# Patient Record
Sex: MALE | Race: WHITE | NOT HISPANIC OR LATINO | Employment: FULL TIME | URBAN - METROPOLITAN AREA
[De-identification: names, ages, dates, MRNs, and addresses within clinical notes are randomized per-mention and may not be internally consistent; named-entity substitution may affect disease eponyms.]

---

## 2023-07-25 ENCOUNTER — OFFICE VISIT (OUTPATIENT)
Dept: PRIMARY CARE | Facility: CLINIC | Age: 58
End: 2023-07-25
Payer: COMMERCIAL

## 2023-07-25 VITALS
DIASTOLIC BLOOD PRESSURE: 78 MMHG | HEIGHT: 69 IN | WEIGHT: 194.4 LBS | SYSTOLIC BLOOD PRESSURE: 132 MMHG | RESPIRATION RATE: 18 BRPM | BODY MASS INDEX: 28.79 KG/M2 | TEMPERATURE: 98.6 F | HEART RATE: 90 BPM | OXYGEN SATURATION: 96 %

## 2023-07-25 DIAGNOSIS — R35.1 NOCTURIA: ICD-10-CM

## 2023-07-25 DIAGNOSIS — R79.89 ELEVATED LFTS: ICD-10-CM

## 2023-07-25 DIAGNOSIS — I10 PRIMARY HYPERTENSION: ICD-10-CM

## 2023-07-25 DIAGNOSIS — S46.012A STRAIN OF ROTATOR CUFF OF LEFT SHOULDER: Primary | ICD-10-CM

## 2023-07-25 DIAGNOSIS — E78.2 MIXED HYPERLIPIDEMIA: ICD-10-CM

## 2023-07-25 PROCEDURE — 3008F BODY MASS INDEX DOCD: CPT | Performed by: FAMILY MEDICINE

## 2023-07-25 PROCEDURE — 3078F DIAST BP <80 MM HG: CPT | Performed by: FAMILY MEDICINE

## 2023-07-25 PROCEDURE — 3075F SYST BP GE 130 - 139MM HG: CPT | Performed by: FAMILY MEDICINE

## 2023-07-25 PROCEDURE — 99213 OFFICE O/P EST LOW 20 MIN: CPT | Performed by: FAMILY MEDICINE

## 2023-07-25 PROCEDURE — 4004F PT TOBACCO SCREEN RCVD TLK: CPT | Performed by: FAMILY MEDICINE

## 2023-07-25 RX ORDER — METOPROLOL SUCCINATE 100 MG/1
1 TABLET, EXTENDED RELEASE ORAL DAILY
COMMUNITY
Start: 2019-05-23 | End: 2023-12-12 | Stop reason: SDUPTHER

## 2023-07-25 RX ORDER — ROSUVASTATIN CALCIUM 40 MG/1
TABLET, COATED ORAL
COMMUNITY
Start: 2022-01-20 | End: 2023-07-25 | Stop reason: SDUPTHER

## 2023-07-25 RX ORDER — EPLERENONE 25 MG/1
25 TABLET, FILM COATED ORAL DAILY
Qty: 30 TABLET | Refills: 5 | Status: SHIPPED | OUTPATIENT
Start: 2023-07-25 | End: 2023-12-12 | Stop reason: SDUPTHER

## 2023-07-25 RX ORDER — LISINOPRIL AND HYDROCHLOROTHIAZIDE 20; 25 MG/1; MG/1
1 TABLET ORAL DAILY
COMMUNITY
Start: 2019-04-15 | End: 2023-12-12 | Stop reason: SDUPTHER

## 2023-07-25 RX ORDER — EPLERENONE 25 MG/1
1 TABLET, FILM COATED ORAL DAILY
COMMUNITY
Start: 2022-03-23 | End: 2023-07-25 | Stop reason: SDUPTHER

## 2023-07-25 RX ORDER — ROSUVASTATIN CALCIUM 40 MG/1
40 TABLET, COATED ORAL DAILY
Qty: 30 TABLET | Refills: 5 | Status: SHIPPED | OUTPATIENT
Start: 2023-07-25 | End: 2023-09-05 | Stop reason: SDUPTHER

## 2023-07-25 RX ORDER — MECLIZINE HCL 12.5 MG 12.5 MG/1
1 TABLET ORAL 3 TIMES DAILY PRN
COMMUNITY
End: 2023-12-12 | Stop reason: SDUPTHER

## 2023-07-25 RX ORDER — CHOLECALCIFEROL (VITAMIN D3) 25 MCG
1 TABLET ORAL DAILY
COMMUNITY

## 2023-07-25 RX ORDER — AMLODIPINE BESYLATE 10 MG/1
1 TABLET ORAL DAILY
COMMUNITY
Start: 2018-04-25 | End: 2023-12-12 | Stop reason: SDUPTHER

## 2023-07-25 ASSESSMENT — LIFESTYLE VARIABLES
HOW MANY STANDARD DRINKS CONTAINING ALCOHOL DO YOU HAVE ON A TYPICAL DAY: 3 OR 4
SKIP TO QUESTIONS 9-10: 0
HOW OFTEN DO YOU HAVE SIX OR MORE DRINKS ON ONE OCCASION: WEEKLY
HOW OFTEN DO YOU HAVE A DRINK CONTAINING ALCOHOL: 2-3 TIMES A WEEK
AUDIT-C TOTAL SCORE: 7

## 2023-07-25 ASSESSMENT — PATIENT HEALTH QUESTIONNAIRE - PHQ9
2. FEELING DOWN, DEPRESSED OR HOPELESS: NOT AT ALL
SUM OF ALL RESPONSES TO PHQ9 QUESTIONS 1 & 2: 0
1. LITTLE INTEREST OR PLEASURE IN DOING THINGS: NOT AT ALL

## 2023-07-25 ASSESSMENT — ENCOUNTER SYMPTOMS
ARTHRALGIAS: 1
DEPRESSION: 0
OCCASIONAL FEELINGS OF UNSTEADINESS: 0
LOSS OF SENSATION IN FEET: 0

## 2023-07-25 NOTE — PATIENT INSTRUCTIONS
Diagnoses and all orders for this visit:  Strain of rotator cuff of left shoulder  -     Referral to Orthopaedic Surgery; Future  Primary hypertension  -     CBC and Auto Differential; Future  -     TSH with reflex to Free T4 if abnormal; Future  -     eplerenone (Inspra) 25 mg tablet; Take 1 tablet (25 mg) by mouth once daily.  Mixed hyperlipidemia  -     Lipid Panel; Future  -     rosuvastatin (Crestor) 40 mg tablet; Take 1 tablet (40 mg) by mouth once daily.  Elevated LFTs  -     Comprehensive Metabolic Panel; Future  Nocturia  -     Prostate Specific Antigen; Future

## 2023-07-25 NOTE — PROGRESS NOTES
Subjective   Patient ID: Kalyan Ramon is a 57 y.o. male.    Shoulder Injury     Right handed  Left sided shoulder pain, right handed, and onset 7/4/2023. Since then cannot lift above 45 degrees  Review of Systems   Musculoskeletal:  Positive for arthralgias.        Left shoulder pain   All other systems reviewed and are negative.      Objective   Physical Exam  Vitals reviewed.   Constitutional:       Appearance: Normal appearance.   HENT:      Head: Normocephalic and atraumatic.      Right Ear: Tympanic membrane normal.      Left Ear: Tympanic membrane normal.      Nose: Nose normal.      Mouth/Throat:      Mouth: Mucous membranes are moist.      Pharynx: Oropharynx is clear.   Eyes:      Extraocular Movements: Extraocular movements intact.      Conjunctiva/sclera: Conjunctivae normal.      Pupils: Pupils are equal, round, and reactive to light.   Cardiovascular:      Rate and Rhythm: Normal rate and regular rhythm.      Pulses: Normal pulses.      Heart sounds: Normal heart sounds.   Pulmonary:      Effort: Pulmonary effort is normal.      Breath sounds: Normal breath sounds.   Abdominal:      General: Abdomen is flat. Bowel sounds are normal.      Palpations: Abdomen is soft.   Musculoskeletal:      Cervical back: Normal range of motion and neck supple.      Comments: Left shoulder with decreased range of motion, in flexion and internal external rotation no crepitus, minimal discomfort. Strength 5/5 reflexes 2+   Skin:     General: Skin is warm and dry.      Capillary Refill: Capillary refill takes less than 2 seconds.   Neurological:      General: No focal deficit present.      Mental Status: He is alert and oriented to person, place, and time.   Psychiatric:         Mood and Affect: Mood normal.         Behavior: Behavior normal.         Assessment/Plan   Diagnoses and all orders for this visit:  Strain of rotator cuff of left shoulder  -     Referral to Orthopaedic Surgery; Future  Primary hypertension  -      CBC and Auto Differential; Future  -     TSH with reflex to Free T4 if abnormal; Future  -     eplerenone (Inspra) 25 mg tablet; Take 1 tablet (25 mg) by mouth once daily.  Mixed hyperlipidemia  -     Lipid Panel; Future  -     rosuvastatin (Crestor) 40 mg tablet; Take 1 tablet (40 mg) by mouth once daily.  Elevated LFTs  -     Comprehensive Metabolic Panel; Future  Nocturia  -     Prostate Specific Antigen; Future

## 2023-09-05 ENCOUNTER — TELEPHONE (OUTPATIENT)
Dept: PRIMARY CARE | Facility: CLINIC | Age: 58
End: 2023-09-05
Payer: COMMERCIAL

## 2023-09-05 DIAGNOSIS — E78.2 MIXED HYPERLIPIDEMIA: ICD-10-CM

## 2023-09-05 RX ORDER — ROSUVASTATIN CALCIUM 40 MG/1
40 TABLET, COATED ORAL DAILY
Qty: 30 TABLET | Refills: 5 | Status: SHIPPED | OUTPATIENT
Start: 2023-09-05 | End: 2023-09-05 | Stop reason: SDUPTHER

## 2023-09-05 RX ORDER — ROSUVASTATIN CALCIUM 20 MG/1
20 TABLET, COATED ORAL DAILY
Qty: 30 TABLET | Refills: 5 | Status: SHIPPED | OUTPATIENT
Start: 2023-09-05 | End: 2023-12-12 | Stop reason: WASHOUT

## 2023-09-05 NOTE — TELEPHONE ENCOUNTER
Needs 20 Mg Rouvastatin sent to [facility] pharmacy will not fill 40 cardiologist wants it at 20 mg

## 2023-09-22 ENCOUNTER — HOSPITAL ENCOUNTER (OUTPATIENT)
Dept: DATA CONVERSION | Facility: HOSPITAL | Age: 58
Discharge: HOME | End: 2023-09-22

## 2023-09-22 DIAGNOSIS — M75.22 BICIPITAL TENDINITIS, LEFT SHOULDER: ICD-10-CM

## 2023-09-22 DIAGNOSIS — M75.42 IMPINGEMENT SYNDROME OF LEFT SHOULDER: ICD-10-CM

## 2023-09-22 DIAGNOSIS — M75.102 UNSPECIFIED ROTATOR CUFF TEAR OR RUPTURE OF LEFT SHOULDER, NOT SPECIFIED AS TRAUMATIC: ICD-10-CM

## 2023-09-22 LAB
ALBUMIN SERPL-MCNC: 4.8 GM/DL (ref 3.5–5)
ALBUMIN/GLOB SERPL: 1.4 RATIO (ref 1.5–3)
ALP BLD-CCNC: 91 U/L (ref 35–125)
ALT SERPL-CCNC: 42 U/L (ref 5–40)
ANION GAP SERPL CALCULATED.3IONS-SCNC: 12 MMOL/L (ref 0–19)
ANTICOAGULANT: ABNORMAL
APTT PPP: 24.1 SEC (ref 22–32.5)
AST SERPL-CCNC: 32 U/L (ref 5–40)
BASOPHILS # BLD AUTO: 0.03 K/UL (ref 0–0.22)
BASOPHILS NFR BLD AUTO: 0.4 % (ref 0–1)
BILIRUB SERPL-MCNC: 0.5 MG/DL (ref 0.1–1.2)
BILIRUB UR QL STRIP.AUTO: NEGATIVE
BUN SERPL-MCNC: 11 MG/DL (ref 8–25)
BUN/CREAT SERPL: 13.8 RATIO (ref 8–21)
CALCIUM SERPL-MCNC: 10.1 MG/DL (ref 8.5–10.4)
CHLORIDE SERPL-SCNC: 100 MMOL/L (ref 97–107)
CLARITY UR: CLEAR
CO2 SERPL-SCNC: 26 MMOL/L (ref 24–31)
COLOR UR: YELLOW
CREAT SERPL-MCNC: 0.8 MG/DL (ref 0.4–1.6)
DEPRECATED RDW RBC AUTO: 39.4 FL (ref 37–54)
DIFFERENTIAL METHOD BLD: ABNORMAL
EOSINOPHIL # BLD AUTO: 0.16 K/UL (ref 0–0.45)
EOSINOPHIL NFR BLD: 2 % (ref 0–3)
ERYTHROCYTE [DISTWIDTH] IN BLOOD BY AUTOMATED COUNT: 12 % (ref 11.7–15)
GFR SERPL CREATININE-BSD FRML MDRD: 105 ML/MIN/1.73 M2
GLOBULIN SER-MCNC: 3.4 G/DL (ref 1.9–3.7)
GLUCOSE SERPL-MCNC: 133 MG/DL (ref 65–99)
GLUCOSE UR STRIP.AUTO-MCNC: 100 MG/DL
HCT VFR BLD AUTO: 43.7 % (ref 41–50)
HGB BLD-MCNC: 15 GM/DL (ref 13.5–16.5)
HGB UR QL STRIP.AUTO: ABNORMAL /HPF (ref 0–3)
HGB UR QL: NEGATIVE
IMM GRANULOCYTES # BLD AUTO: 0.03 K/UL (ref 0–0.1)
INR PPP: 0.9 (ref 0.86–1.16)
KETONES UR QL STRIP.AUTO: NEGATIVE
LEUKOCYTE ESTERASE UR QL STRIP.AUTO: NEGATIVE
LYMPHOCYTES # BLD AUTO: 1.18 K/UL (ref 1.2–3.2)
LYMPHOCYTES NFR BLD MANUAL: 14.5 % (ref 20–40)
MCH RBC QN AUTO: 30.7 PG (ref 26–34)
MCHC RBC AUTO-ENTMCNC: 34.3 % (ref 31–37)
MCV RBC AUTO: 89.5 FL (ref 80–100)
MICROSCOPIC (UA): ABNORMAL
MONOCYTES # BLD AUTO: 0.83 K/UL (ref 0–0.8)
MONOCYTES NFR BLD MANUAL: 10.2 % (ref 0–8)
NEUTROPHILS # BLD AUTO: 5.89 K/UL
NEUTROPHILS # BLD AUTO: 5.89 K/UL (ref 1.8–7.7)
NEUTROPHILS.IMMATURE NFR BLD: 0.4 % (ref 0–1)
NEUTS SEG NFR BLD: 72.5 % (ref 50–70)
NITRITE UR QL STRIP.AUTO: NEGATIVE
PH UR STRIP.AUTO: 6.5 [PH] (ref 4.6–8)
PLATELET # BLD AUTO: 289 K/UL (ref 150–450)
PMV BLD AUTO: 8.3 CU (ref 7–12.6)
POTASSIUM SERPL-SCNC: 4.1 MMOL/L (ref 3.4–5.1)
PROT SERPL-MCNC: 8.2 G/DL (ref 5.9–7.9)
PROT UR STRIP.AUTO-MCNC: NEGATIVE MG/DL
PROTHROMBIN TIME: 9.2 SEC (ref 9.3–12.7)
RBC # BLD AUTO: 4.88 M/UL (ref 4.5–5.5)
SODIUM SERPL-SCNC: 138 MMOL/L (ref 133–145)
SP GR UR STRIP.AUTO: 1.01 (ref 1–1.03)
URINE CULTURE: ABNORMAL
UROBILINOGEN UR QL STRIP.AUTO: 0.2 MG/DL (ref 0–1)
WBC # BLD AUTO: 8.1 K/UL (ref 4.5–11)
WBC #/AREA URNS AUTO: ABNORMAL /HPF (ref 0–3)

## 2023-09-29 ENCOUNTER — HOSPITAL ENCOUNTER (OUTPATIENT)
Dept: DATA CONVERSION | Facility: HOSPITAL | Age: 58
Discharge: HOME | End: 2023-09-29

## 2023-09-29 DIAGNOSIS — M75.42 IMPINGEMENT SYNDROME OF LEFT SHOULDER: ICD-10-CM

## 2023-09-29 DIAGNOSIS — M75.102 UNSPECIFIED ROTATOR CUFF TEAR OR RUPTURE OF LEFT SHOULDER, NOT SPECIFIED AS TRAUMATIC: ICD-10-CM

## 2023-09-29 DIAGNOSIS — M75.22 BICIPITAL TENDINITIS, LEFT SHOULDER: ICD-10-CM

## 2023-10-13 PROBLEM — E66.9 OBESITY: Status: ACTIVE | Noted: 2023-10-13

## 2023-10-13 PROBLEM — E78.1 HIGH TRIGLYCERIDES: Status: ACTIVE | Noted: 2023-10-13

## 2023-10-13 PROBLEM — I10 ESSENTIAL HYPERTENSION, BENIGN: Status: ACTIVE | Noted: 2023-10-13

## 2023-10-13 PROBLEM — E78.5 DYSLIPIDEMIA: Status: ACTIVE | Noted: 2023-10-13

## 2023-10-13 PROBLEM — R42 DIZZINESS: Status: ACTIVE | Noted: 2023-10-13

## 2023-10-13 PROBLEM — T14.8XXA WOUND INFECTION: Status: ACTIVE | Noted: 2023-10-13

## 2023-10-13 PROBLEM — I25.10 CORONARY ARTERY CALCIFICATION: Status: ACTIVE | Noted: 2023-10-13

## 2023-10-13 PROBLEM — M75.102 TEAR OF LEFT ROTATOR CUFF: Status: ACTIVE | Noted: 2023-07-25

## 2023-10-13 PROBLEM — E03.9 HYPOTHYROIDISM: Status: ACTIVE | Noted: 2023-10-13

## 2023-10-13 PROBLEM — R35.1 NOCTURIA: Status: ACTIVE | Noted: 2023-10-13

## 2023-10-13 PROBLEM — I25.84 CORONARY ARTERY CALCIFICATION: Status: ACTIVE | Noted: 2023-10-13

## 2023-10-13 PROBLEM — L08.9 WOUND INFECTION: Status: ACTIVE | Noted: 2023-10-13

## 2023-10-13 PROBLEM — R93.1 ELEVATED CORONARY ARTERY CALCIUM SCORE: Status: ACTIVE | Noted: 2023-10-13

## 2023-10-13 PROBLEM — K43.2 HERNIATION THROUGH SURGICAL SITE: Status: ACTIVE | Noted: 2023-10-13

## 2023-10-13 RX ORDER — OXYCODONE HYDROCHLORIDE 5 MG/1
1 TABLET ORAL EVERY 4 HOURS PRN
COMMUNITY
End: 2023-12-12 | Stop reason: ALTCHOICE

## 2023-10-13 RX ORDER — ACETAMINOPHEN 500 MG
2 TABLET ORAL EVERY 8 HOURS
COMMUNITY

## 2023-10-13 RX ORDER — ONDANSETRON 4 MG/1
1 TABLET, FILM COATED ORAL EVERY 6 HOURS PRN
COMMUNITY
End: 2023-12-12 | Stop reason: ALTCHOICE

## 2023-10-13 RX ORDER — MELOXICAM 7.5 MG/1
1 TABLET ORAL
COMMUNITY
Start: 2023-08-14 | End: 2023-12-12 | Stop reason: ALTCHOICE

## 2023-10-16 ENCOUNTER — OFFICE VISIT (OUTPATIENT)
Dept: ORTHOPEDIC SURGERY | Facility: CLINIC | Age: 58
End: 2023-10-16
Payer: COMMERCIAL

## 2023-10-16 VITALS — HEIGHT: 69 IN | WEIGHT: 190 LBS | BODY MASS INDEX: 28.14 KG/M2

## 2023-10-16 DIAGNOSIS — S46.012D TRAUMATIC COMPLETE TEAR OF LEFT ROTATOR CUFF, SUBSEQUENT ENCOUNTER: Primary | ICD-10-CM

## 2023-10-16 PROCEDURE — 99024 POSTOP FOLLOW-UP VISIT: CPT | Performed by: STUDENT IN AN ORGANIZED HEALTH CARE EDUCATION/TRAINING PROGRAM

## 2023-10-16 PROCEDURE — 4004F PT TOBACCO SCREEN RCVD TLK: CPT | Performed by: STUDENT IN AN ORGANIZED HEALTH CARE EDUCATION/TRAINING PROGRAM

## 2023-10-16 PROCEDURE — 3008F BODY MASS INDEX DOCD: CPT | Performed by: STUDENT IN AN ORGANIZED HEALTH CARE EDUCATION/TRAINING PROGRAM

## 2023-10-16 ASSESSMENT — PAIN - FUNCTIONAL ASSESSMENT: PAIN_FUNCTIONAL_ASSESSMENT: 0-10

## 2023-10-16 ASSESSMENT — PAIN SCALES - GENERAL: PAINLEVEL_OUTOF10: 1

## 2023-10-16 NOTE — PROGRESS NOTES
2 weeks status post LEFT SHOULDER ARTHROSCOPY, ROTATOR CUFF REPAIR, BICEPS TENODESIS, SUBACROMIAL DECOMPRESSION, BICEPS TENODESIS done 9/29/23. Patient states he is doing well with minimal pain.

## 2023-10-23 NOTE — PROGRESS NOTES
PRIMARY CARE PHYSICIAN: Indy Del Cid MD    ORTHOPAEDIC POSTOPERATIVE VISIT    ASSESSMENT & PLAN    Impression: 57 y.o. male 2 weeks postop s/p left shoulder arthroscopy, rotator cuff repair, biceps tenodesis and subacromial decompression.    Plan:   Overall he is doing well.  He will begin working on [employer]'s pendulums but will remain in the sling otherwise.  No formal range of motion of the shoulder at this time.    I reviewed the intraoperative findings with the patient and answered all their questions. I reviewed their postoperative timeline and plan with them. They understand the postoperative precautions and the treatment plan going forward.     Follow-Up: Patient will follow-up 4 weeks, no x-rays    At the end of the visit, all questions were answered in full. The patient is in agreement with the plan and recommendations. They will call the office with any questions/concerns.      Note dictated with [email or web address] software. Completed without full typed error editing and sent to avoid delay.      SUBJECTIVE  CHIEF COMPLAINT:   Chief Complaint   Patient presents with    Left Shoulder - Post-op        HPI: Kalyan Ramon is a 57 y.o. patient. Kalyan Ramon is now postop status post left shoulder arthroscopy, rotator cuff repair, biceps tenodesis and subacromial decompression.  Overall he is doing well.  He is adhering to his postoperative precautions.    REVIEW OF SYSTEMS  Constitutional: See HPI for pain assessment, No significant weight loss, recent trauma  Cardiovascular: No chest pain, shortness of breath  Respiratory: No difficulty breathing, cough  Gastrointestinal: No nausea, vomiting, diarrhea, constipation  Musculoskeletal: Noted in HPI, positive for pain, restricted motion, stiffness  Integumentary: No rashes, easy bruising, redness   Neurological: no numbness or tingling in extremities, no gait disturbances   Psychiatric: No mood changes, memory changes, social  "issues  Heme/Lymph: no excessive swelling, easy bruising, excessive bleeding  ENT: No hearing changes  Eyes: No vision changes    CURRENT MEDICATIONS:   Current Outpatient Medications   Medication Sig Dispense Refill    acetaminophen (Tylenol) 500 mg tablet Take 2 tablets (1,000 mg) by mouth every 8 hours.      amLODIPine (Norvasc) 10 mg tablet Take 1 tablet (10 mg) by mouth once daily.      cholecalciferol (Vitamin D-3) 25 MCG (1000 UT) tablet Take 1 tablet (25 mcg) by mouth once daily.      eplerenone (Inspra) 25 mg tablet Take 1 tablet (25 mg) by mouth once daily. 30 tablet 5    lisinopriL-hydrochlorothiazide 20-25 mg tablet Take 1 tablet by mouth once daily.      meclizine (Antivert) 12.5 mg tablet Take 1 tablet (12.5 mg) by mouth 3 times a day as needed.      metoprolol succinate XL (Toprol-XL) 100 mg 24 hr tablet Take 1 tablet (100 mg) by mouth once daily.      rosuvastatin (Crestor) 20 mg tablet Take 1 tablet (20 mg) by mouth once daily. 30 tablet 5    meloxicam (Mobic) 7.5 mg tablet Take 1 tablet (7.5 mg) by mouth 2 times a day with meals.      ondansetron (Zofran) 4 mg tablet Take 1 tablet (4 mg) by mouth every 6 hours if needed for nausea.      oxyCODONE (Roxicodone) 5 mg immediate release tablet Take 1 tablet (5 mg) by mouth every 4 hours if needed (pain).       No current facility-administered medications for this visit.        OBJECTIVE    PHYSICAL EXAM      1/20/2022     3:33 PM 2/24/2022     2:57 PM 8/29/2022     1:03 PM 12/19/2022     8:13 AM 3/2/2023     2:37 PM 7/25/2023    11:38 AM 10/16/2023     2:23 PM   Vitals   Systolic 188 158 138 140 130 132    Diastolic 96 80 58 78 80 78    Heart Rate 82 89 84 76 97 90    Temp    36.5 °C (97.7 °F)  37 °C (98.6 °F)    Resp 17 17  17  18    Height (in) 1.727 m (5' 8\") 1.727 m (5' 8\") 1.727 m (5' 8\") 1.727 m (5' 8\") 1.727 m (5' 8\") 1.753 m (5' 9\") 1.753 m (5' 9\")   Weight (lb) 193 191 191 193 193 194.4 190   BMI 29.35 kg/m2 29.04 kg/m2 29.04 kg/m2 29.35 kg/m2 " 29.35 kg/m2 28.71 kg/m2 28.06 kg/m2   BSA (m2) 2.05 m2 2.04 m2 2.04 m2 2.05 m2 2.05 m2 2.07 m2 2.05 m2   Visit Report      Report Report      Body mass index is 28.06 kg/m².    General: Well-appearing, no acute distress    Skin intact bilateral upper and lower extremities  No erythema  No warmth    Detailed examination of the left shoulder demonstrates:  Surgical incision(s) healing well, Steri-Strips in place  No erythema or warmth  No drainage  No ecchymosis  Resolving swelling, minimal  Biceps contour normal  Shoulder range of motion deferred  Upper extremity motor grossly intact  C5-T1 sensation intact bilaterally  2+ radial pulses bilaterally  Warm and well-perfused, brisk capillary refill    IMAGING:   No new imaging      Pete Ramirez MD  Attending Surgeon    Sports Medicine Orthopaedic Surgery  [facility] [place]  [facility]

## 2023-11-20 ENCOUNTER — OFFICE VISIT (OUTPATIENT)
Dept: ORTHOPEDIC SURGERY | Facility: CLINIC | Age: 58
End: 2023-11-20
Payer: COMMERCIAL

## 2023-11-20 VITALS — WEIGHT: 190 LBS | HEIGHT: 69 IN | BODY MASS INDEX: 28.14 KG/M2

## 2023-11-20 DIAGNOSIS — S46.012D TRAUMATIC COMPLETE TEAR OF LEFT ROTATOR CUFF, SUBSEQUENT ENCOUNTER: ICD-10-CM

## 2023-11-20 PROCEDURE — 3008F BODY MASS INDEX DOCD: CPT | Performed by: STUDENT IN AN ORGANIZED HEALTH CARE EDUCATION/TRAINING PROGRAM

## 2023-11-20 PROCEDURE — 99024 POSTOP FOLLOW-UP VISIT: CPT | Performed by: STUDENT IN AN ORGANIZED HEALTH CARE EDUCATION/TRAINING PROGRAM

## 2023-11-20 PROCEDURE — 4004F PT TOBACCO SCREEN RCVD TLK: CPT | Performed by: STUDENT IN AN ORGANIZED HEALTH CARE EDUCATION/TRAINING PROGRAM

## 2023-11-20 NOTE — PROGRESS NOTES
PRIMARY CARE PHYSICIAN: Indy Del Cid MD    ORTHOPAEDIC POSTOPERATIVE VISIT    ASSESSMENT & PLAN    Impression: 57 y.o. male 7 weeks postop s/p left shoulder arthroscopy, rotator cuff repair, biceps tenodesis and subacromial decompression.    Plan:   Overall he is doing well.  He will discontinue his sling and begin working with physical therapy through the postoperative protocol for the above.  He will focus on regaining his passive range of motion.    I reviewed their postoperative timeline and plan with them. They understand the postoperative precautions and the treatment plan going forward.     Follow-Up: Patient will follow-up 6 weeks, no x-rays    At the end of the visit, all questions were answered in full. The patient is in agreement with the plan and recommendations. They will call the office with any questions/concerns.      Note dictated with [employer] software. Completed without full typed error editing and sent to avoid delay.      SUBJECTIVE  CHIEF COMPLAINT:   Chief Complaint   Patient presents with    Left Shoulder - Post-op     9/29/23        HPI: Kalyan Ramon is a 57 y.o. patient. Kalyan Ramon is now 7 weeks postop status post left shoulder arthroscopy, rotator cuff repair, biceps tenodesis and subacromial decompression.  Overall he is doing well.  He is adhering to his postoperative precautions.    REVIEW OF SYSTEMS  Constitutional: See HPI for pain assessment, No significant weight loss, recent trauma  Cardiovascular: No chest pain, shortness of breath  Respiratory: No difficulty breathing, cough  Gastrointestinal: No nausea, vomiting, diarrhea, constipation  Musculoskeletal: Noted in HPI, positive for pain, restricted motion, stiffness  Integumentary: No rashes, easy bruising, redness   Neurological: no numbness or tingling in extremities, no gait disturbances   Psychiatric: No mood changes, memory changes, social issues  Heme/Lymph: no excessive swelling, easy  "bruising, excessive bleeding  ENT: No hearing changes  Eyes: No vision changes    CURRENT MEDICATIONS:   Current Outpatient Medications   Medication Sig Dispense Refill    acetaminophen (Tylenol) 500 mg tablet Take 2 tablets (1,000 mg) by mouth every 8 hours.      amLODIPine (Norvasc) 10 mg tablet Take 1 tablet (10 mg) by mouth once daily.      cholecalciferol (Vitamin D-3) 25 MCG (1000 UT) tablet Take 1 tablet (25 mcg) by mouth once daily.      eplerenone (Inspra) 25 mg tablet Take 1 tablet (25 mg) by mouth once daily. 30 tablet 5    lisinopriL-hydrochlorothiazide 20-25 mg tablet Take 1 tablet by mouth once daily.      meclizine (Antivert) 12.5 mg tablet Take 1 tablet (12.5 mg) by mouth 3 times a day as needed.      meloxicam (Mobic) 7.5 mg tablet Take 1 tablet (7.5 mg) by mouth 2 times a day with meals.      metoprolol succinate XL (Toprol-XL) 100 mg 24 hr tablet Take 1 tablet (100 mg) by mouth once daily.      rosuvastatin (Crestor) 20 mg tablet Take 1 tablet (20 mg) by mouth once daily. 30 tablet 5    ondansetron (Zofran) 4 mg tablet Take 1 tablet (4 mg) by mouth every 6 hours if needed for nausea.      oxyCODONE (Roxicodone) 5 mg immediate release tablet Take 1 tablet (5 mg) by mouth every 4 hours if needed (pain).       No current facility-administered medications for this visit.        OBJECTIVE    PHYSICAL EXAM      2/24/2022     2:57 PM 8/29/2022     1:03 PM 12/19/2022     8:13 AM 3/2/2023     2:37 PM 7/25/2023    11:38 AM 10/16/2023     2:23 PM 11/20/2023    12:49 PM   Vitals   Systolic 158 138 140 130 132     Diastolic 80 58 78 80 78     Heart Rate 89 84 76 97 90     Temp   36.5 °C (97.7 °F)  37 °C (98.6 °F)     Resp 17  17  18     Height (in) 1.727 m (5' 8\") 1.727 m (5' 8\") 1.727 m (5' 8\") 1.727 m (5' 8\") 1.753 m (5' 9\") 1.753 m (5' 9\") 1.753 m (5' 9\")   Weight (lb) 191 191 193 193 194.4 190 190   BMI 29.04 kg/m2 29.04 kg/m2 29.35 kg/m2 29.35 kg/m2 28.71 kg/m2 28.06 kg/m2 28.06 kg/m2   BSA (m2) 2.04 m2 " 2.04 m2 2.05 m2 2.05 m2 2.07 m2 2.05 m2 2.05 m2   Visit Report     Report Report Report      Body mass index is 28.06 kg/m².    General: Well-appearing, no acute distress    Skin intact bilateral upper and lower extremities  No erythema  No warmth    Detailed examination of the left shoulder demonstrates:  Surgical incision(s) well-healed  No erythema or warmth  No ecchymosis or soft tissue swelling  Biceps contour normal  Shoulder range of motion: Forward flexion to 90, ER to neutral  Upper extremity motor grossly intact  C5-T1 sensation intact bilaterally  2+ radial pulses bilaterally  Warm and well-perfused, brisk capillary refill    IMAGING:   No new imaging      Pete Ramirez MD  Attending Surgeon    Sports Medicine Orthopaedic Surgery  [facility] [place]  [facility]

## 2023-11-20 NOTE — LETTER
November 20, 2023     Patient: Kalyan Ramon   YOB: 1965   Date of Visit: 11/20/2023       To Whom it May Concern:    Kalyan Ramon was seen in my clinic on 11/20/2023. He may return to work on 11/27/23 with light duty restriction until reevaluation. No pushing pulling or lifting over 2lbs with his left arm. Any questions or concerns please call us at [phone]          Sincerely,          Pete Ramirez MD

## 2023-11-28 ENCOUNTER — EVALUATION (OUTPATIENT)
Dept: PHYSICAL THERAPY | Facility: HOSPITAL | Age: 58
End: 2023-11-28
Payer: COMMERCIAL

## 2023-11-28 DIAGNOSIS — S46.012D TRAUMATIC COMPLETE TEAR OF LEFT ROTATOR CUFF, SUBSEQUENT ENCOUNTER: ICD-10-CM

## 2023-11-28 PROCEDURE — 97161 PT EVAL LOW COMPLEX 20 MIN: CPT | Mod: GP | Performed by: PHYSICAL THERAPIST

## 2023-11-28 ASSESSMENT — PATIENT HEALTH QUESTIONNAIRE - PHQ9
2. FEELING DOWN, DEPRESSED OR HOPELESS: NOT AT ALL
1. LITTLE INTEREST OR PLEASURE IN DOING THINGS: NOT AT ALL
SUM OF ALL RESPONSES TO PHQ9 QUESTIONS 1 AND 2: 0
1. LITTLE INTEREST OR PLEASURE IN DOING THINGS: NOT AT ALL
SUM OF ALL RESPONSES TO PHQ9 QUESTIONS 1 AND 2: 0
2. FEELING DOWN, DEPRESSED OR HOPELESS: NOT AT ALL

## 2023-11-28 ASSESSMENT — PAIN - FUNCTIONAL ASSESSMENT: PAIN_FUNCTIONAL_ASSESSMENT: 0-10

## 2023-11-28 ASSESSMENT — ENCOUNTER SYMPTOMS
DEPRESSION: 0
LOSS OF SENSATION IN FEET: 0
OCCASIONAL FEELINGS OF UNSTEADINESS: 0

## 2023-11-28 ASSESSMENT — PAIN SCALES - GENERAL: PAINLEVEL_OUTOF10: 2

## 2023-11-28 NOTE — PROGRESS NOTES
Physical Therapy    Physical Therapy Evaluation    Patient Name: Kalyan Ramon  MRN: [id number]  Today's Date: 11/28/23    Time Calculation  Start Time: 0850  Stop Time: 0940  Time Calculation (min): 50 min  Visit #1  Assessment Pt is 8 weels post op left rotator cuff repair.     Plan  Phase 1 protocol, 8 weeks was on 11/24/23. Phase II begins week 10 (Dec 8th)     Treatment/Interventions: Cryotherapy, Education/ Instruction, Electrical stimulation, Hot pack, Manual therapy, Neuromuscular re-education, Therapeutic activities, Therapeutic exercises, Self care/ home management    Follow per Dr Ramirez RTC protocol    Current Problem  1. Traumatic complete tear of left rotator cuff, subsequent encounter  Referral to Physical Therapy    Follow Up In Physical Therapy          Subjective  Pt hurt shoulder while lifting on 7/4/23, he finally had surgery on 9/29/23. He wore a sling until 11/20/23. He only did pendulum for exercise. 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 He is trying to not use let arm    Objective   Posture:  forward head and rounded shoulder     Range of Motion:  PROM Left shoulder Flexion 80 degrees, abduction is 50 degrees, Right shoulder is WFL     Strength:  Not tested on Left due to protocol, Right UE all 5/5 strength      Palpation:   Tender left anterior shoulder, upper trap, pec, incision is healing well, no signs of infection.          Outcome Measures:  Other Measures  Disability of Arm Shoulder Hand (DASH): 54.55     OP EDUCATION: Reviewed protocol and precautions, gave pt info on how to order shoulder pulley for when  protocol allows.  EXERCISES       Date 11/28/23      VISIT# # 1 # # #    REPS REPS REPS REPS   Pendulums HEP      AROM forearm, wrist and hand HEP       strength/squeezes HEP      Scapular squeezes, depressions and shrugs HEP                           PROM  right left shoulder      FF,ABD and ER with in comfortable range next                                  Cold Pacl/ Moist Heat  E Stim   as needed PRN                                                                                                                             HEP            Goals:  Active       PT Problem       PT Goals       Start:  11/29/23    Expected End:  02/29/24       In 6-8 weeks, pt will demonstrate:    1) Decreased pain to 2/10 max left shoulder, to allow greater ease with ADL, improved sleep    2) Increased PROM by 20 degrees all planes per protocol    3) Improved posture in sitting and standing     4) Independent with HEP    New Goals for strength and functional mobility will be established as protocol allows

## 2023-11-30 ENCOUNTER — TREATMENT (OUTPATIENT)
Dept: PHYSICAL THERAPY | Facility: HOSPITAL | Age: 58
End: 2023-11-30
Payer: COMMERCIAL

## 2023-11-30 DIAGNOSIS — S46.012D TRAUMATIC COMPLETE TEAR OF LEFT ROTATOR CUFF, SUBSEQUENT ENCOUNTER: ICD-10-CM

## 2023-11-30 PROCEDURE — 97140 MANUAL THERAPY 1/> REGIONS: CPT | Mod: GP,CQ | Performed by: PHYSICAL THERAPY ASSISTANT

## 2023-11-30 PROCEDURE — 97110 THERAPEUTIC EXERCISES: CPT | Mod: GP,CQ | Performed by: PHYSICAL THERAPY ASSISTANT

## 2023-11-30 ASSESSMENT — PAIN - FUNCTIONAL ASSESSMENT: PAIN_FUNCTIONAL_ASSESSMENT: 0-10

## 2023-11-30 ASSESSMENT — PAIN SCALES - GENERAL: PAINLEVEL_OUTOF10: 1

## 2023-11-30 NOTE — PROGRESS NOTES
"Physical Therapy    Physical Therapy Treatment    Patient Name: Kalyan Ramon  MRN: [id number]  Today's Date: 11/30/2023  Time Calculation  Start Time: 1345  Stop Time: 1430  Time Calculation (min): 45 min      Assessment:   Pt tolerated initial session well with no increased pain with PROM.   Plan:   Progress per protocol.     Current Problem  1. Traumatic complete tear of left rotator cuff, subsequent encounter  Follow Up In Physical Therapy          General   Pt reports no pain in shoulder. 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       Subjective    Precautions  Precautions  STEADI Fall Risk Score (The score of 4 or more indicates an increased risk of falling): 0  Precautions Comment: none    Pain  Pain Assessment  Pain Assessment: 0-10  Pain Score: 1    Objective     Treatments:     EXERCISES       Date 11/28/23 11/30/23     VISIT# # 1 # 2 # #    REPS REPS REPS REPS   Pendulums HEP      AROM forearm, wrist and hand HEP       strength/squeezes HEP      Scapular squeezes, depressions and shrugs HEP 3\" 2 x 10 each                          PROM  right left shoulder      FF,ABD and ER with in comfortable range next 25 min                                 Cold Pacl/ Moist Heat  E Stim   as needed PRN 10 min CP                                                                                                                            HEP           OP EDUCATION:       Goals:  Active       PT Problem       PT Goals       Start:  11/29/23    Expected End:  02/29/24       In 6-8 weeks, pt will demonstrate:    1) Decreased pain to 2/10 max left shoulder, to allow greater ease with ADL, improved sleep    2) Increased PROM by 20 degrees all planes per protocol    3) Improved posture in sitting and standing     4) Independent with HEP    New Goals for strength and functional mobility will be established as protocol allows           "

## 2023-12-06 ENCOUNTER — TREATMENT (OUTPATIENT)
Dept: PHYSICAL THERAPY | Facility: HOSPITAL | Age: 58
End: 2023-12-06
Payer: COMMERCIAL

## 2023-12-06 DIAGNOSIS — S46.012D TRAUMATIC COMPLETE TEAR OF LEFT ROTATOR CUFF, SUBSEQUENT ENCOUNTER: ICD-10-CM

## 2023-12-06 PROCEDURE — 97110 THERAPEUTIC EXERCISES: CPT | Mod: GP,CQ

## 2023-12-06 PROCEDURE — 97140 MANUAL THERAPY 1/> REGIONS: CPT | Mod: GP,CQ

## 2023-12-06 NOTE — PROGRESS NOTES
"Physical Therapy    Physical Therapy Treatment    Patient Name: Kalyan Ramon  MRN: [id number]  : 1965   Today's Date: 2023  Time Calculation  Start Time: 221  Stop Time: 300  Time Calculation (min): 39 min  Visit # 3    Current Problem  1. Traumatic complete tear of left rotator cuff, subsequent encounter  Follow Up In Physical Therapy            Subjective   Pt states shld feels good with no pain        Precautions     PROTOCOL     Pain   0/10    Objective    PROM L shld 90*    Treatments:  EXERCISES       Date 23    VISIT# # 1 # 2 #3 #    REPS REPS REPS REPS   Pendulums HEP      AROM forearm, wrist and hand HEP       strength/squeezes HEP      Scapular squeezes, depressions and shrugs HEP 3\" 2 x 10 each 3\" 2 x 10 each                         PROM  right left shoulder      FF,ABD and ER with in comfortable range next 25 min 25 '                                Cold Pack/ Moist Heat  E Stim   as needed PRN 10 min CP 10 min CP                                                                                                                           HEP         Assessment:     Pt reports pain and tightness at end of ROM. ROM increased from previous measurements.     Plan:     Cont per protocol to improve ROM  OP EDUCATION:       Goals:  Active       PT Problem       PT Goals       Start:  23    Expected End:  24       In 6-8 weeks, pt will demonstrate:    1) Decreased pain to 2/10 max left shoulder, to allow greater ease with ADL, improved sleep    2) Increased PROM by 20 degrees all planes per protocol    3) Improved posture in sitting and standing     4) Independent with HEP    New Goals for strength and functional mobility will be established as protocol allows              .  "

## 2023-12-07 ENCOUNTER — LAB (OUTPATIENT)
Dept: LAB | Facility: LAB | Age: 58
End: 2023-12-07
Payer: COMMERCIAL

## 2023-12-07 DIAGNOSIS — R79.89 ELEVATED LFTS: ICD-10-CM

## 2023-12-07 DIAGNOSIS — I10 PRIMARY HYPERTENSION: ICD-10-CM

## 2023-12-07 DIAGNOSIS — R35.1 NOCTURIA: ICD-10-CM

## 2023-12-07 DIAGNOSIS — E78.2 MIXED HYPERLIPIDEMIA: ICD-10-CM

## 2023-12-07 LAB
ALBUMIN SERPL BCP-MCNC: 4.7 G/DL (ref 3.4–5)
ALP SERPL-CCNC: 84 U/L (ref 33–120)
ALT SERPL W P-5'-P-CCNC: 49 U/L (ref 10–52)
ANION GAP SERPL CALC-SCNC: 15 MMOL/L (ref 10–20)
AST SERPL W P-5'-P-CCNC: 40 U/L (ref 9–39)
BASOPHILS # BLD AUTO: 0.04 X10*3/UL (ref 0–0.1)
BASOPHILS NFR BLD AUTO: 0.5 %
BILIRUB SERPL-MCNC: 0.6 MG/DL (ref 0–1.2)
BUN SERPL-MCNC: 8 MG/DL (ref 6–23)
CALCIUM SERPL-MCNC: 9.5 MG/DL (ref 8.6–10.3)
CHLORIDE SERPL-SCNC: 99 MMOL/L (ref 98–107)
CHOLEST SERPL-MCNC: 151 MG/DL (ref 0–199)
CHOLESTEROL/HDL RATIO: 1.8
CO2 SERPL-SCNC: 26 MMOL/L (ref 21–32)
CREAT SERPL-MCNC: 0.65 MG/DL (ref 0.5–1.3)
EOSINOPHIL # BLD AUTO: 0.29 X10*3/UL (ref 0–0.7)
EOSINOPHIL NFR BLD AUTO: 3.3 %
ERYTHROCYTE [DISTWIDTH] IN BLOOD BY AUTOMATED COUNT: 12.3 % (ref 11.5–14.5)
GFR SERPL CREATININE-BSD FRML MDRD: >90 ML/MIN/1.73M*2
GLUCOSE SERPL-MCNC: 89 MG/DL (ref 74–99)
HCT VFR BLD AUTO: 47 % (ref 41–52)
HDLC SERPL-MCNC: 82.9 MG/DL
HGB BLD-MCNC: 16.1 G/DL (ref 13.5–17.5)
IMM GRANULOCYTES # BLD AUTO: 0.05 X10*3/UL (ref 0–0.7)
IMM GRANULOCYTES NFR BLD AUTO: 0.6 % (ref 0–0.9)
LDLC SERPL CALC-MCNC: 52 MG/DL
LYMPHOCYTES # BLD AUTO: 1.95 X10*3/UL (ref 1.2–4.8)
LYMPHOCYTES NFR BLD AUTO: 22.2 %
MCH RBC QN AUTO: 31.2 PG (ref 26–34)
MCHC RBC AUTO-ENTMCNC: 34.3 G/DL (ref 32–36)
MCV RBC AUTO: 91 FL (ref 80–100)
MONOCYTES # BLD AUTO: 0.85 X10*3/UL (ref 0.1–1)
MONOCYTES NFR BLD AUTO: 9.7 %
NEUTROPHILS # BLD AUTO: 5.62 X10*3/UL (ref 1.2–7.7)
NEUTROPHILS NFR BLD AUTO: 63.7 %
NON HDL CHOLESTEROL: 68 MG/DL (ref 0–149)
NRBC BLD-RTO: 0 /100 WBCS (ref 0–0)
PLATELET # BLD AUTO: 314 X10*3/UL (ref 150–450)
POTASSIUM SERPL-SCNC: 4.1 MMOL/L (ref 3.5–5.3)
PROT SERPL-MCNC: 7.8 G/DL (ref 6.4–8.2)
PSA SERPL-MCNC: 0.5 NG/ML
RBC # BLD AUTO: 5.16 X10*6/UL (ref 4.5–5.9)
SODIUM SERPL-SCNC: 136 MMOL/L (ref 136–145)
TRIGL SERPL-MCNC: 82 MG/DL (ref 0–149)
TSH SERPL-ACNC: 1.65 MIU/L (ref 0.44–3.98)
VLDL: 16 MG/DL (ref 0–40)
WBC # BLD AUTO: 8.8 X10*3/UL (ref 4.4–11.3)

## 2023-12-07 PROCEDURE — 80061 LIPID PANEL: CPT

## 2023-12-07 PROCEDURE — 80053 COMPREHEN METABOLIC PANEL: CPT

## 2023-12-07 PROCEDURE — 85025 COMPLETE CBC W/AUTO DIFF WBC: CPT

## 2023-12-07 PROCEDURE — 36415 COLL VENOUS BLD VENIPUNCTURE: CPT

## 2023-12-07 PROCEDURE — 84153 ASSAY OF PSA TOTAL: CPT

## 2023-12-07 PROCEDURE — 84443 ASSAY THYROID STIM HORMONE: CPT

## 2023-12-08 ENCOUNTER — TREATMENT (OUTPATIENT)
Dept: PHYSICAL THERAPY | Facility: HOSPITAL | Age: 58
End: 2023-12-08
Payer: COMMERCIAL

## 2023-12-08 DIAGNOSIS — S46.012D TRAUMATIC COMPLETE TEAR OF LEFT ROTATOR CUFF, SUBSEQUENT ENCOUNTER: ICD-10-CM

## 2023-12-08 PROCEDURE — 97140 MANUAL THERAPY 1/> REGIONS: CPT | Mod: GP,CQ

## 2023-12-08 PROCEDURE — 97110 THERAPEUTIC EXERCISES: CPT | Mod: GP,CQ

## 2023-12-08 ASSESSMENT — PAIN SCALES - GENERAL: PAINLEVEL_OUTOF10: 0 - NO PAIN

## 2023-12-08 ASSESSMENT — PAIN - FUNCTIONAL ASSESSMENT: PAIN_FUNCTIONAL_ASSESSMENT: 0-10

## 2023-12-08 NOTE — PROGRESS NOTES
"Physical Therapy    Physical Therapy Treatment    Patient Name: Kalyan Ramon  MRN: [id number]  Today's Date: 12/8/2023  Time Calculation  Start Time: 1015  Stop Time: 1100  Time Calculation (min): 45 min  VISIT# 4      Current Problem  1. Traumatic complete tear of left rotator cuff, subsequent encounter  Follow Up In Physical Therapy            Subjective   No pain reported      Precautions  Precautions  STEADI Fall Risk Score (The score of 4 or more indicates an increased risk of falling): 0  Precautions Comment: none   Media Information             Pain  Pain Assessment: 0-10  Pain Score: 0 - No pain    Objective   Access Code: [email or web address]  URL: [email or web address]  Date: 12/08/2023  Prepared by: Gil Pizarro    Exercises  - Supine Shoulder Flexion Extension AAROM with Dowel  - 1-2 x daily - 5-7 x weekly - 3 sets - 10 reps  - Seated Shoulder External Rotation AAROM with Cane and Hand in Neutral (Mirrored)  - 1-2 x daily - 5-7 x weekly - 3 sets - 10 reps  - Supine Shoulder Abduction AAROM with Dowel  - 1-2 x daily - 5-7 x weekly - 3 sets - 10 reps  - Seated Shoulder Flexion AAROM with Pulley Behind  - 1-2 x daily - 5-7 x weekly - 1 sets - 1 reps - 3 mins hold  - Seated Shoulder Abduction AAROM with Pulley Behind  - 1-2 x daily - 5-7 x weekly - 1 sets - 1 reps - 3 mins hold  - Seated Shoulder Internal Rotation AAROM with Pulley (Mirrored)  - 1-2 x daily - 5-7 x weekly - 1 sets - 1 reps - 3 mins hold  - Standing Shoulder and Trunk Flexion at Table  - 1-2 x daily - 5-7 x weekly - 3 sets - 10 reps    Treatments:    EXERCISES       Date 11/28/23 11/30/23 12-6-23 12/8/2023     VISIT# # 1 # 2 #3 #4    REPS REPS REPS REPS   Pendulums HEP      AROM forearm, wrist and hand HEP       strength/squeezes HEP      Scapular squeezes, depressions and shrugs HEP 3\" 2 x 10 each 3\" 2 x 10 each           Pulleys  flexion    3'   Walter abduction/scaption    3'   Walter  IR     3'          Wand:  " flexion/abduction/ER    2 x 10 ea                               PROM  right left shoulder      FF,ABD and ER with in comfortable range next 25 min 25 ' 15'          Cold Pack/ Moist Heat  E Stim   as needed PRN 10 min CP 10 min CP                  10 wks 12/8/23       14 wks 1/5/24       HEP    issued       Assessment:   Pt was issued HEP, all were demo and questions were addressed.  Pt was given ice to go        Plan: Cont to progress per protocol   OP PT Plan  Treatment/Interventions: Cryotherapy, Education/ Instruction, Electrical stimulation, Hot pack, Manual therapy, Neuromuscular re-education, Therapeutic activities, Therapeutic exercises, Self care/ home management  OP EDUCATION:  Outpatient Education  Individual(s) Educated: Patient  Education Provided: Home Exercise Program  Patient Response to Education: Patient/Caregiver Verbalized Understanding of Information, Patient/Caregiver Performed Return Demonstration of Exercises/Activities  Education Comment:  (Access Code: [email or web address]  URL: [email or web address]      Goals:  Active       PT Problem       PT Goals       Start:  11/29/23    Expected End:  02/29/24       In 6-8 weeks, pt will demonstrate:    1) Decreased pain to 2/10 max left shoulder, to allow greater ease with ADL, improved sleep    2) Increased PROM by 20 degrees all planes per protocol    3) Improved posture in sitting and standing     4) Independent with HEP    New Goals for strength and functional mobility will be established as protocol allows

## 2023-12-12 ENCOUNTER — OFFICE VISIT (OUTPATIENT)
Dept: PRIMARY CARE | Facility: CLINIC | Age: 58
End: 2023-12-12
Payer: COMMERCIAL

## 2023-12-12 ENCOUNTER — TELEPHONE (OUTPATIENT)
Dept: PRIMARY CARE | Facility: CLINIC | Age: 58
End: 2023-12-12

## 2023-12-12 VITALS
SYSTOLIC BLOOD PRESSURE: 138 MMHG | DIASTOLIC BLOOD PRESSURE: 80 MMHG | RESPIRATION RATE: 16 BRPM | HEART RATE: 94 BPM | OXYGEN SATURATION: 97 % | HEIGHT: 69 IN | BODY MASS INDEX: 28.73 KG/M2 | WEIGHT: 194 LBS

## 2023-12-12 DIAGNOSIS — Z00.00 ANNUAL PHYSICAL EXAM: ICD-10-CM

## 2023-12-12 DIAGNOSIS — R42 VERTIGO: ICD-10-CM

## 2023-12-12 DIAGNOSIS — E78.5 DYSLIPIDEMIA: ICD-10-CM

## 2023-12-12 DIAGNOSIS — I25.84 CORONARY ARTERY CALCIFICATION: Primary | ICD-10-CM

## 2023-12-12 DIAGNOSIS — I10 PRIMARY HYPERTENSION: ICD-10-CM

## 2023-12-12 DIAGNOSIS — I25.10 CORONARY ARTERY CALCIFICATION: Primary | ICD-10-CM

## 2023-12-12 DIAGNOSIS — E78.2 MIXED HYPERLIPIDEMIA: ICD-10-CM

## 2023-12-12 DIAGNOSIS — I10 ESSENTIAL HYPERTENSION, BENIGN: ICD-10-CM

## 2023-12-12 PROCEDURE — 3008F BODY MASS INDEX DOCD: CPT | Performed by: FAMILY MEDICINE

## 2023-12-12 PROCEDURE — 3079F DIAST BP 80-89 MM HG: CPT | Performed by: FAMILY MEDICINE

## 2023-12-12 PROCEDURE — 3075F SYST BP GE 130 - 139MM HG: CPT | Performed by: FAMILY MEDICINE

## 2023-12-12 PROCEDURE — 99396 PREV VISIT EST AGE 40-64: CPT | Performed by: FAMILY MEDICINE

## 2023-12-12 PROCEDURE — 4004F PT TOBACCO SCREEN RCVD TLK: CPT | Performed by: FAMILY MEDICINE

## 2023-12-12 RX ORDER — MECLIZINE HCL 12.5 MG 12.5 MG/1
12.5 TABLET ORAL 3 TIMES DAILY PRN
Qty: 30 TABLET | Refills: 5 | Status: SHIPPED | OUTPATIENT
Start: 2023-12-12

## 2023-12-12 RX ORDER — AMLODIPINE BESYLATE 10 MG/1
10 TABLET ORAL DAILY
Qty: 30 TABLET | Refills: 11 | Status: SHIPPED | OUTPATIENT
Start: 2023-12-12 | End: 2024-12-11

## 2023-12-12 RX ORDER — LISINOPRIL AND HYDROCHLOROTHIAZIDE 20; 25 MG/1; MG/1
1 TABLET ORAL DAILY
Qty: 30 TABLET | Refills: 11 | Status: SHIPPED | OUTPATIENT
Start: 2023-12-12 | End: 2024-12-11

## 2023-12-12 RX ORDER — EPLERENONE 25 MG/1
25 TABLET, FILM COATED ORAL DAILY
Qty: 30 TABLET | Refills: 11 | Status: SHIPPED | OUTPATIENT
Start: 2023-12-12 | End: 2024-03-06

## 2023-12-12 RX ORDER — ROSUVASTATIN CALCIUM 20 MG/1
20 TABLET, COATED ORAL DAILY
Qty: 30 TABLET | Refills: 11 | Status: SHIPPED | OUTPATIENT
Start: 2023-12-12

## 2023-12-12 RX ORDER — METOPROLOL SUCCINATE 100 MG/1
100 TABLET, EXTENDED RELEASE ORAL DAILY
Qty: 30 TABLET | Refills: 11 | Status: SHIPPED | OUTPATIENT
Start: 2023-12-12 | End: 2024-12-11

## 2023-12-12 RX ORDER — ROSUVASTATIN CALCIUM 20 MG/1
20 TABLET, COATED ORAL DAILY
Qty: 30 TABLET | Refills: 5 | COMMUNITY
Start: 2023-12-12 | End: 2023-12-12 | Stop reason: SDUPTHER

## 2023-12-12 ASSESSMENT — PATIENT HEALTH QUESTIONNAIRE - PHQ9
1. LITTLE INTEREST OR PLEASURE IN DOING THINGS: NOT AT ALL
2. FEELING DOWN, DEPRESSED OR HOPELESS: NOT AT ALL
SUM OF ALL RESPONSES TO PHQ9 QUESTIONS 1 AND 2: 0

## 2023-12-12 ASSESSMENT — COLUMBIA-SUICIDE SEVERITY RATING SCALE - C-SSRS
1. IN THE PAST MONTH, HAVE YOU WISHED YOU WERE DEAD OR WISHED YOU COULD GO TO SLEEP AND NOT WAKE UP?: NO
6. HAVE YOU EVER DONE ANYTHING, STARTED TO DO ANYTHING, OR PREPARED TO DO ANYTHING TO END YOUR LIFE?: NO
2. HAVE YOU ACTUALLY HAD ANY THOUGHTS OF KILLING YOURSELF?: NO

## 2023-12-12 NOTE — PATIENT INSTRUCTIONS
Assessment/Plan   Healthy male exam. Leg swelling should continue to resolve, discuss with  and he can adjust water pill if needed.      1. Annual exam.   2. Patient Counseling:  --Nutrition: Stressed importance of moderation in sodium/caffeine intake, saturated fat and cholesterol, caloric balance, sufficient intake of fresh fruits, vegetables, fiber, calcium, iron, and 1 mg of folate supplement per day (for females capable of pregnancy).  --Discussed the issue of estrogen replacement, calcium supplement, and the daily use of baby aspirin.  --Exercise: Stressed the importance of regular exercise.   --Substance Abuse: Discussed cessation/primary prevention of tobacco, alcohol, or other drug use; driving or other dangerous activities under the influence; availability of treatment for abuse.    --Sexuality: Discussed sexually transmitted diseases, partner selection, use of condoms, avoidance of unintended pregnancy  and contraceptive alternatives.   --Injury prevention: Discussed safety belts, safety helmets, smoke detector, smoking near bedding or upholstery.   --Dental health: Discussed importance of regular tooth brushing, flossing, and dental visits.  --Immunizations reviewed.  --Discussed benefits of screening colonoscopy.  --After hours service discussed with patient  3. Discussed the patient's BMI with him.  The BMI is in the acceptable range.  4. Follow up in one year         Latest Reference Range & Units 12/07/23 07:33   SODIUM 136 - 145 mmol/L 136   POTASSIUM 3.5 - 5.3 mmol/L 4.1   CHLORIDE 98 - 107 mmol/L 99   Bicarbonate 21 - 32 mmol/L 26   Blood Urea Nitrogen 6 - 23 mg/dL 8   Creatinine 0.50 - 1.30 mg/dL 0.65   GLUCOSE 74 - 99 mg/dL 89   Calcium 8.6 - 10.3 mg/dL 9.5   Thyroid Stimulating Hormone 0.44 - 3.98 mIU/L 1.65   Alkaline Phosphatase 33 - 120 U/L 84   Albumin 3.4 - 5.0 g/dL 4.7   Total Protein 6.4 - 8.2 g/dL 7.8   AST 9 - 39 U/L 40 (H)   ALT 10 - 52 U/L 49 [1]   Bilirubin Total 0.0 -  1.2 mg/dL 0.6   PSA <=4.00 ng/mL 0.50   WBC 4.4 - 11.3 x10*3/uL 8.8   RBC 4.50 - 5.90 x10*6/uL 5.16   HEMOGLOBIN 13.5 - 17.5 g/dL 16.1   HEMATOCRIT 41.0 - 52.0 % 47.0   MCV 80 - 100 fL 91   MCH 26.0 - 34.0 pg 31.2   MCHC 32.0 - 36.0 g/dL 34.3   RED CELL DISTRIBUTION WIDTH 11.5 - 14.5 % 12.3   Platelets 150 - 450 x10*3/uL 314   Neutrophils Absolute 1.20 - 7.70 x10*3/uL 5.62 [2]   Neutrophils % 40.0 - 80.0 % 63.7   Lymphocytes Absolute 1.20 - 4.80 x10*3/uL 1.95   Lymphocytes % 13.0 - 44.0 % 22.2   Monocytes Absolute 0.10 - 1.00 x10*3/uL 0.85   Monocytes % 2.0 - 10.0 % 9.7   Eosinophils Absolute 0.00 - 0.70 x10*3/uL 0.29   Eosinophils % 0.0 - 6.0 % 3.3   Basophils Absolute 0.00 - 0.10 x10*3/uL 0.04   Basophils % 0.0 - 2.0 % 0.5   CHOLESTEROL 0 - 199 mg/dL 151 [3]   HDL CHOLESTEROL mg/dL 82.9 [4]   Cholesterol/HDL Ratio  1.8 [5]   LDL Calculated <=99 mg/dL 52 [6]   VLDL 0 - 40 mg/dL 16   TRIGLYCERIDES 0 - 149 mg/dL 82 [7]   (H): Data is abnormally high  [1] Patients treated with Sulfasalazine may generate falsely decreased results for ALT.  [2] Percent differential counts (%) should be interpreted in the context of the absolute cell counts (cells/uL).  [3]       Age      Desirable   Borderline High   High     0-19 Y     0 - 169       170 - 199     >/= 200    20-24 Y     0 - 189       190 - 224     >/= 225         >24 Y     0 - 199       200 - 239     >/= 240   **All ranges are based on fasting samples. Specific   therapeutic targets will vary based on patient-specific   cardiac risk.    Pediatric guidelines reference:Pediatrics 2011, 128(S5).Adult guidelines reference: NCEP ATPIII Guidelines,KATHERYN 2001, 258:2486-97    Venipuncture immediately after or during the administration of Metamizole may lead to falsely low results. Testing should be performed immediately prior to Metamizole dosing.  [4]   Age       Very Low   Low     Normal    High    0-19 Y    < 35      < 40     40-45     ----  20-24 Y    ----     < 40      >45       ----        >24 Y      ----     < 40     40-60      >60    [5]   Ref Values  Desirable  < 3.4  High Risk  > 5.0  [6]                             Near   Borderline      AGE      Desirable  Optimal    High     High     Very High     0-19 Y     0 - 109     ---    110-129   >/= 130     ----    20-24 Y     0 - 119     ---    120-159   >/= 160     ----      >24 Y     0 -  99   100-129  130-159   160-189     >/=190    [7]    Age         Desirable   Borderline High   High     Very High   0 D-90 D    19 - 174         ----         ----        ----  91 D- 9 Y     0 -  74        75 -  99     >/= 100      ----    10-19 Y     0 -  89        90 - 129     >/= 130      ----    20-24 Y     0 - 114       115 - 149     >/= 150      ----         >24 Y     0 - 149       150 - 199    200- 499    >/= 500    Venipuncture immediately after or during the administration of Metamizole may lead to falsely low results. Testing should be performed immediately prior to Metamizole dosing.

## 2023-12-12 NOTE — PROGRESS NOTES
Subjective   Kalyan Ramon is a 58 y.o. male and is here for a comprehensive physical exam. The patient reports no problems. Recent shoulder surgery , rotator cuff repair, also had had bright red blood, was due for colonoscopy, will follow up with Jose Roberto after PT for shoulder.  Last physical:   Changes yes Shoulder surgery  Concerns no  Dentist no  Vision no  Hearing Problems no  Diet no  Exercise yes  Alcohol no  Drugs no  Social History     Tobacco Use   Smoking Status Every Day    Packs/day: 0.25    Years: 30.00    Additional pack years: 0.00    Total pack years: 7.50    Types: Cigarettes   Smokeless Tobacco Never          Do you take any herbs or supplements that were not prescribed by a doctor? no  Are you taking calcium supplements? no  Are you taking aspirin daily? no      History:  Not due for PSA here  Do you have pain that bothers you in your daily life? no  Review of Systems   Cardiovascular:  Positive for leg swelling.        More swelling after surgery        Objective   Physical Exam  Vitals reviewed.   Constitutional:       Appearance: Normal appearance.   HENT:      Head: Normocephalic and atraumatic.      Right Ear: Tympanic membrane normal.      Left Ear: Tympanic membrane normal.      Nose: Nose normal.      Mouth/Throat:      Mouth: Mucous membranes are moist.      Pharynx: Oropharynx is clear.   Eyes:      Extraocular Movements: Extraocular movements intact.      Conjunctiva/sclera: Conjunctivae normal.      Pupils: Pupils are equal, round, and reactive to light.   Cardiovascular:      Rate and Rhythm: Normal rate and regular rhythm.      Pulses: Normal pulses.      Heart sounds: Normal heart sounds.   Pulmonary:      Effort: Pulmonary effort is normal.      Breath sounds: Normal breath sounds.   Abdominal:      General: Abdomen is flat. Bowel sounds are normal.      Palpations: Abdomen is soft.   Musculoskeletal:         General: Swelling present. Normal range of motion.      Cervical  back: Normal range of motion and neck supple.      Comments: Bilateral LE edema, non pitting   Skin:     General: Skin is warm and dry.      Capillary Refill: Capillary refill takes less than 2 seconds.   Neurological:      General: No focal deficit present.      Mental Status: He is alert and oriented to person, place, and time.   Psychiatric:         Mood and Affect: Mood normal.         Behavior: Behavior normal.         Assessment/Plan   Healthy male exam. Leg swelling should continue to resolve, discuss with  and he can adjust water pill if needed.      1. Annual exam.   2. Patient Counseling:  --Nutrition: Stressed importance of moderation in sodium/caffeine intake, saturated fat and cholesterol, caloric balance, sufficient intake of fresh fruits, vegetables, fiber, calcium, iron, and 1 mg of folate supplement per day (for females capable of pregnancy).  --Discussed the issue of estrogen replacement, calcium supplement, and the daily use of baby aspirin.  --Exercise: Stressed the importance of regular exercise.   --Substance Abuse: Discussed cessation/primary prevention of tobacco, alcohol, or other drug use; driving or other dangerous activities under the influence; availability of treatment for abuse.    --Sexuality: Discussed sexually transmitted diseases, partner selection, use of condoms, avoidance of unintended pregnancy  and contraceptive alternatives.   --Injury prevention: Discussed safety belts, safety helmets, smoke detector, smoking near bedding or upholstery.   --Dental health: Discussed importance of regular tooth brushing, flossing, and dental visits.  --Immunizations reviewed.  --Discussed benefits of screening colonoscopy.  --After hours service discussed with patient  3. Discussed the patient's BMI with him.  The BMI is in the acceptable range.  4. Follow up in one year

## 2023-12-13 ENCOUNTER — TREATMENT (OUTPATIENT)
Dept: PHYSICAL THERAPY | Facility: HOSPITAL | Age: 58
End: 2023-12-13
Payer: COMMERCIAL

## 2023-12-13 DIAGNOSIS — S46.012D TRAUMATIC COMPLETE TEAR OF LEFT ROTATOR CUFF, SUBSEQUENT ENCOUNTER: ICD-10-CM

## 2023-12-13 PROCEDURE — 97110 THERAPEUTIC EXERCISES: CPT | Mod: GP | Performed by: PHYSICAL THERAPIST

## 2023-12-13 PROCEDURE — 97140 MANUAL THERAPY 1/> REGIONS: CPT | Mod: GP | Performed by: PHYSICAL THERAPIST

## 2023-12-13 ASSESSMENT — PAIN - FUNCTIONAL ASSESSMENT: PAIN_FUNCTIONAL_ASSESSMENT: 0-10

## 2023-12-13 ASSESSMENT — PAIN SCALES - GENERAL: PAINLEVEL_OUTOF10: 2

## 2023-12-13 NOTE — PROGRESS NOTES
"Physical Therapy    Physical Therapy Treatment    Patient Name: Kalyan Ramon  MRN: [id number]  Today's Date: 12/13/2023  Time Calculation  Start Time: 0945  Stop Time: 1030  Time Calculation (min): 45 min      Assessment: Pt sore with addition of pulleys, he has difficulty relaxing during PROM, manual therapy     Plan: Focus on AAROM. Almost 11 weeks, continue per protocol, no AROM for 3 weeks yet       Current Problem  1. Traumatic complete tear of left rotator cuff, subsequent encounter  Follow Up In Physical Therapy              Subjective  Pt was really sore after the addition of pulleys last session  Precautions  see protocol  Precautions  STEADI Fall Risk Score (The score of 4 or more indicates an increased risk of falling): 0  Precautions Comment: see protocol  Pain  Pain Assessment  Pain Assessment: 0-10  Pain Score: 2    Objective PROM flexion 98 degrees with stiff end feel and complaints of pain  Treatments:  EXERCISES        Date 11/28/23 11/30/23 12-6-23 12/8/2023 12/13/2023     VISIT# # 1 # 2 #3 #4 #5    REPS REPS REPS REPS REPS   Pendulums HEP       AROM forearm, wrist and hand HEP        strength/squeezes HEP       Scapular squeezes, depressions and shrugs HEP 3\" 2 x 10 each 3\" 2 x 10 each             Pulleys  flexion    3' 3min   Pulley abduction/scaption    3' 3 min   [place]  IR     3'            Wand:  flexion/abduction/ER    2 x 10 ea    UE [place]   seated Forward Flexion                      Circumduction CW/CCW                       Seated scaption                      Seated overhead press     20x  20x  ---  ----                           PROM  right left shoulder      FF,ABD and ER with in comfortable range next 25 min 25 ' 15' 15 min           Cold Pack/ Moist Heat  E Stim   as needed PRN 10 min CP 10 min CP                     10 wks 12/8/23        14 wks 1/5/24        HEP    issued        OP EDUCATION: Reviewed pulleys, use of sling in crowded places and for sleeping if needed, or if " standing for a long period of time Reviewed proper positioning in stiting, standing and lying.        Goals:  Active       PT Problem       PT Goals       Start:  11/29/23    Expected End:  02/29/24       In 6-8 weeks, pt will demonstrate:    1) Decreased pain to 2/10 max left shoulder, to allow greater ease with ADL, improved sleep    2) Increased PROM by 20 degrees all planes per protocol    3) Improved posture in sitting and standing     4) Independent with HEP    New Goals for strength and functional mobility will be established as protocol allows

## 2023-12-15 ENCOUNTER — TREATMENT (OUTPATIENT)
Dept: PHYSICAL THERAPY | Facility: HOSPITAL | Age: 58
End: 2023-12-15
Payer: COMMERCIAL

## 2023-12-15 DIAGNOSIS — S46.012D TRAUMATIC COMPLETE TEAR OF LEFT ROTATOR CUFF, SUBSEQUENT ENCOUNTER: ICD-10-CM

## 2023-12-15 PROCEDURE — 97110 THERAPEUTIC EXERCISES: CPT | Mod: GP,CQ | Performed by: PHYSICAL THERAPY ASSISTANT

## 2023-12-15 PROCEDURE — 97140 MANUAL THERAPY 1/> REGIONS: CPT | Mod: GP,CQ | Performed by: PHYSICAL THERAPY ASSISTANT

## 2023-12-15 ASSESSMENT — PAIN - FUNCTIONAL ASSESSMENT: PAIN_FUNCTIONAL_ASSESSMENT: 0-10

## 2023-12-15 ASSESSMENT — PAIN DESCRIPTION - DESCRIPTORS: DESCRIPTORS: DULL;ACHING

## 2023-12-15 ASSESSMENT — PAIN SCALES - GENERAL: PAINLEVEL_OUTOF10: 2

## 2023-12-15 NOTE — PROGRESS NOTES
"Physical Therapy    Physical Therapy Treatment    Patient Name: Kalyan Ramon  MRN: [id number]  Today's Date: 12/15/2023  Time Calculation  Start Time: 0800  Stop Time: 0850  Time Calculation (min): 50 min      Assessment:   ROM slowly improving, pt has difficulty relaxing during PROM, manual therapy     Plan:   Focus on AAROM. Almost 11 weeks, continue per protocol, no AROM for 3 weeks yet   RTD 1/3/23    Current Problem  1. Traumatic complete tear of left rotator cuff, subsequent encounter  Follow Up In Physical Therapy              Subjective  Pt reports dull ache in shldr this date.    Precautions  STEADI Fall Risk Score (The score of 4 or more indicates an increased risk of falling): 0  Precautions Comment: see protocol  Pain  Pain Assessment  Pain Assessment: 0-10  Pain Score: 2  Pain Descriptors: Dull, Aching    Objective PROM flexion 98 degrees with stiff end feel and complaints of pain  Treatments:  EXERCISES         Date 11/28/23 11/30/23 12-6-23 12/8/2023   12/13/23   12/15/23   VISIT# # 1 # 2 #3 #4 #5 #6    REPS REPS REPS REPS REPS    Pendulums HEP        AROM forearm, wrist and hand HEP         strength/squeezes HEP        Scapular squeezes, depressions and shrugs HEP 3\" 2 x 10 each 3\" 2 x 10 each               Pulleys  flexion    3' 3min 3 min   Pulley abduction/scaption    3' 3 min 3 min   [place]  IR     3'              Wand:  flexion/abduction/ER    2 x 10 ea  1 x 10 flex, ER   UE [place]   seated Forward Flexion                      Circumduction CW/CCW                       Seated scaption                      Seated overhead press     20x  20x  ---  ---- 20x  20x                              PROM  right left shoulder      FF,ABD and ER with in comfortable range next 25 min 25 ' 15' 15 min 15 min            Cold Pack/ Moist Heat  E Stim   as needed PRN 10 min CP 10 min CP   10 min                     10 wks 12/8/23         14 wks 1/5/24         HEP    issued         OP EDUCATION: Reviewed " pulleys, use of sling in crowded places and for sleeping if needed, or if standing for a long period of time Reviewed proper positioning in stiting, standing and lying.        Goals:  Active       PT Problem       PT Goals       Start:  11/29/23    Expected End:  02/29/24       In 6-8 weeks, pt will demonstrate:    1) Decreased pain to 2/10 max left shoulder, to allow greater ease with ADL, improved sleep    2) Increased PROM by 20 degrees all planes per protocol    3) Improved posture in sitting and standing     4) Independent with HEP    New Goals for strength and functional mobility will be established as protocol allows

## 2023-12-19 ENCOUNTER — TREATMENT (OUTPATIENT)
Dept: PHYSICAL THERAPY | Facility: HOSPITAL | Age: 58
End: 2023-12-19
Payer: COMMERCIAL

## 2023-12-19 DIAGNOSIS — S46.012D TRAUMATIC COMPLETE TEAR OF LEFT ROTATOR CUFF, SUBSEQUENT ENCOUNTER: ICD-10-CM

## 2023-12-19 PROCEDURE — 97140 MANUAL THERAPY 1/> REGIONS: CPT | Mod: GP | Performed by: PHYSICAL THERAPIST

## 2023-12-19 PROCEDURE — 97110 THERAPEUTIC EXERCISES: CPT | Mod: GP | Performed by: PHYSICAL THERAPIST

## 2023-12-19 ASSESSMENT — PAIN SCALES - GENERAL: PAINLEVEL_OUTOF10: 3

## 2023-12-19 ASSESSMENT — PAIN - FUNCTIONAL ASSESSMENT: PAIN_FUNCTIONAL_ASSESSMENT: 0-10

## 2023-12-19 NOTE — PROGRESS NOTES
"Physical Therapy    Physical Therapy Treatment    Patient Name: Kalyan Ramon  MRN: [id number]  Today's Date: 12/19/2023  Time Calculation  Start Time: 1420  Stop Time: 1505  Time Calculation (min): 45 min  Visit #7    Assessment:  Pt has difficulty relaxing, holding shoulder, upper trap in constant state of tension, resistant to PROM, needs much verbal cuing to relax. He did have decreased pain and stiffness after manual techniques today.     Plan:  continue with PROM, manual techniques per protocol       Current Problem  1. Traumatic complete tear of left rotator cuff, subsequent encounter  Follow Up In Physical Therapy        Subjective  Shoulder and upper trap feel tight, stiff and sometimes painful  Precautions  Precautions  STEADI Fall Risk Score (The score of 4 or more indicates an increased risk of falling): 0  Precautions Comment: see shoulder protocol   Pain  Pain Assessment  Pain Assessment: 0-10  Pain Score: 3    Objective Trigger points bilateral UT, levator, medial scap border on right. PROM scaption 100 degrees, ER to 15 degrees      Treatments:  EXERCISES          Date 11/28/23 11/30/23 12-6-23 12/8/2023   12/13/23   12/15/23 12/19/2023     VISIT# # 1 # 2 #3 #4 #5 #6 #7    REPS REPS REPS REPS REPS     Pendulums HEP         AROM forearm, wrist and hand HEP          strength/squeezes HEP         Scapular squeezes, depressions and shrugs HEP 3\" 2 x 10 each 3\" 2 x 10 each                 Pulleys  flexion    3' 3min 3 min 3 min   Pulley abduction/scaption    3' 3 min 3 min 3 min   [place]  IR     3'                Wand:  flexion/abduction/ER    2 x 10 ea  1 x 10 flex, ER    UE [place]   seated Forward Flexion                      Circumduction CW/CCW                       Seated scaption                      Seated overhead press     20x  20x  ---  ---- 20x  20x 20x  20x   Self stretch  Levator scap                      Upper trap       10 sec hold x5  10 sec hold x5                       PROM  right " left shoulder      FF,ABD and ER with in comfortable range, sidelying gentle scap mobes, trigger point release to left upper trap, levator, pec next 25 min 25 ' 15' 15 min 15 min 25 min             Cold Pack/ Moist Heat  E Stim   as needed PRN 10 min CP 10 min CP   10 min                        10 wks 12/8/23          14 wks 1/5/24          HEP    issued          OP EDUCATION: reviewed HEP, added stretches for upper trap and levator, instructed in self trigger point release using handle of a cane.        Goals:  Active       PT Problem       PT Goals       Start:  11/29/23    Expected End:  02/29/24       In 6-8 weeks, pt will demonstrate:    1) Decreased pain to 2/10 max left shoulder, to allow greater ease with ADL, improved sleep    2) Increased PROM by 20 degrees all planes per protocol    3) Improved posture in sitting and standing     4) Independent with HEP    New Goals for strength and functional mobility will be established as protocol allows

## 2023-12-21 ENCOUNTER — TREATMENT (OUTPATIENT)
Dept: PHYSICAL THERAPY | Facility: HOSPITAL | Age: 58
End: 2023-12-21
Payer: COMMERCIAL

## 2023-12-21 DIAGNOSIS — S46.012D TRAUMATIC COMPLETE TEAR OF LEFT ROTATOR CUFF, SUBSEQUENT ENCOUNTER: ICD-10-CM

## 2023-12-21 PROCEDURE — 97140 MANUAL THERAPY 1/> REGIONS: CPT | Mod: GP | Performed by: PHYSICAL THERAPIST

## 2023-12-21 PROCEDURE — 97110 THERAPEUTIC EXERCISES: CPT | Mod: GP | Performed by: PHYSICAL THERAPIST

## 2023-12-21 ASSESSMENT — PAIN SCALES - GENERAL: PAINLEVEL_OUTOF10: 3

## 2023-12-21 ASSESSMENT — PAIN - FUNCTIONAL ASSESSMENT: PAIN_FUNCTIONAL_ASSESSMENT: 0-10

## 2023-12-21 NOTE — PROGRESS NOTES
"Physical Therapy    Physical Therapy    Physical Therapy Treatment      Patient Name: Kalyan Ramon  MRN: [id number]  Today's Date: 12/21/2023  Visit # 8  Time Calculation  Start Time: 0145  Stop Time: 0230  Time Calculation (min): 45 min      Subjective    Shoulder and upper trap feel tight, stiff and sometimes painful . Difficulty relaxng arm but working on it       Precautions  Precautions  STEADI Fall Risk Score (The score of 4 or more indicates an increased risk of falling): 0    Current Problem:   1. Traumatic complete tear of left rotator cuff, subsequent encounter  Follow Up In Physical Therapy          Pain:  Pain Assessment  Pain Assessment: 0-10  Pain Score: 3      Objective        surgery on 9/29/23 post op left rotator cuff repair.        Trigger points bilateral UT, levator, medial scap border on right  - heat to neck while PROM to shoulder  . PROM scaption 100 degrees, ER to 20 degrees       Treatments:  Treatments:  EXERCISES                  Date 11/28/23 11/30/23 12-6-23 12/8/2023    12/13/23    12/15/23 12/19/2023    12-21-23   VISIT# # 1 # 2 #3 #4 #5 #6 #7 8     REPS REPS REPS REPS REPS        Pendulums HEP                AROM forearm, wrist and hand HEP                 strength/squeezes HEP                Scapular squeezes, depressions and shrugs HEP 3\" 2 x 10 each 3\" 2 x 10 each                               Pulleys  flexion       3' 3min 3 min 3 min 3 min   Pulley abduction/scaption       3' 3 min 3 min 3 min 3 min   [place]  IR        3'                                        Wand:  flexion/abduction/ER       2 x 10 ea   1 x 10 flex, ER   10x 2   UE [place]   seated Forward Flexion                      Circumduction CW/CCW                       Seated scaption                      Seated overhead press         20x  20x  ---  ---- 20x  20x 20x  20x    Self stretch  Levator scap                      Upper trap             10 sec hold x5  10 sec hold x5                                        "   PROM  right left shoulder      FF,ABD and ER with in comfortable range, sidelying gentle scap mobes, trigger point release to left upper trap, levator, pec next 25 min 25 ' 15' 15 min 15 min 25 min 15                                 Cold Pack/ Moist Heat  E Stim   as needed PRN 10 min CP 10 min CP     10 min   Heat to neck with ex    Ice to [place] post tx                       surgery on 9/29/23.                   10 wks 12/8/23                  14 wks 1/5/24                  HEP       issued                  Assessment:   Heat to neck when supine while doing PROM to shouldrer. Work on relaxing shoulder. Pt has difficulty relaxing, holding shoulder, upper trap in constant state of tension, resistant to PROM, needs much verbal cuing to relax. He did have decreased pain and stiffness after manual techniques today.  Provided info on theracane to self massage upper trap.      Plan: continue to work shoulder ROM as tolerated    Goals:  Active       PT Problem       PT Goals       Start:  11/29/23    Expected End:  02/29/24       In 6-8 weeks, pt will demonstrate:    1) Decreased pain to 2/10 max left shoulder, to allow greater ease with ADL, improved sleep    2) Increased PROM by 20 degrees all planes per protocol    3) Improved posture in sitting and standing     4) Independent with HEP    New Goals for strength and functional mobility will be established as protocol allows

## 2023-12-26 ENCOUNTER — TREATMENT (OUTPATIENT)
Dept: PHYSICAL THERAPY | Facility: HOSPITAL | Age: 58
End: 2023-12-26
Payer: COMMERCIAL

## 2023-12-26 DIAGNOSIS — S46.012D TRAUMATIC COMPLETE TEAR OF LEFT ROTATOR CUFF, SUBSEQUENT ENCOUNTER: ICD-10-CM

## 2023-12-26 PROCEDURE — 97140 MANUAL THERAPY 1/> REGIONS: CPT | Mod: GP | Performed by: PHYSICAL THERAPIST

## 2023-12-26 PROCEDURE — 97110 THERAPEUTIC EXERCISES: CPT | Mod: GP | Performed by: PHYSICAL THERAPIST

## 2023-12-26 ASSESSMENT — PAIN SCALES - GENERAL: PAINLEVEL_OUTOF10: 1

## 2023-12-26 ASSESSMENT — PAIN - FUNCTIONAL ASSESSMENT: PAIN_FUNCTIONAL_ASSESSMENT: 0-10

## 2023-12-26 NOTE — PROGRESS NOTES
Physical Therapy    Physical Therapy Treatment    Patient Name: Kalyan Ramon  MRN: [id number]  Today's Date: 12/26/2023  Time Calculation  Start Time: 0915  Stop Time: 1000  Time Calculation (min): 45 min  Assessment: Pt still has stiffness and end range pain, will benefit from continued skilled PT services to advance through protocol     Plan: Pt returning to surgeon next week, will continue per surgeon per protocol. Will advance to Phase 3 after January 5th, 2024       Current Problem  1. Traumatic complete tear of left rotator cuff, subsequent encounter  Follow Up In Physical Therapy              Subjective  pt worried that he won't be able to lift his arm, discussed protocol timeline with patient.  Precautions  Precautions  STEADI Fall Risk Score (The score of 4 or more indicates an increased risk of falling): 0  Precautions Comment: See RTC protocol  Pain  Pain Assessment  Pain Assessment: 0-10  Pain Score: 1    Objective    Shoulder ROM     (PROM)  WFL unless documented below   Flexion Abduction IR ER Fxn IR reach Fxn ER reach   RIGHT         LEFT [phone]        Outcome Measures:  Other Measures  Disability of Arm Shoulder Hand (DASH): 38.64  Treatments:  EXERCISES         Date 12/15/23 12/19/23    12-21-23 12/26/2023     VISIT# #6 #7 8 #9             Pendulums         AROM forearm, wrist and hand          strength/squeezes         Scapular squeezes, depressions and shrugs          Self assist wall slides flex      10x   Pulleys  flexion 3 min 3 min 3 min 3 min   Pulley abduction/scaption 3 min 3 min 3 min 3 min   Pulley  IR           Finger Ladder (after 1/5)         Shoulder ARC (after 1/5)       Wand:  flexion/abduction/ER 1 x 10 flex, ER   10x 2 20x   UE [place]   seated Forward Flexion                      Circumduction CW/CCW                       Seated scaption                      Seated overhead press 20x  20x 20x  20x     Self stretch  Levator scap                      Upper trap   10 sec  hold x5  10 sec hold x5  10 sec hold x5 each                       PROM  right left shoulder      FF,ABD and ER with in comfortable range, sidelying gentle scap mobes, trigger point release to left upper trap, levator, pec, reassess ROM 15 min 25 min 15 20 mins                    Cold Pack/ Moist Heat  E Stim   as needed 10 min   Heat to neck with ex    Ice to [place] post tx               surgery on 9/29/23.          10 wks 12/8/23         14 wks 1/5/24         HEP            OP EDUCATION:  Reviewed HEP, discussed timeline of protocol, reminded patient that his wife could help him with PROM if she would like to come in for instruction.      Goals:  Active       PT Problem       PT Goals       Start:  11/29/23    Expected End:  02/29/24       In 6-8 weeks, pt will demonstrate:    1) Decreased pain to 2/10 max left shoulder, to allow greater ease with ADL, improved sleep  GOAL PROGRESSING    2) Increased PROM by 20 degrees all planes per protocol  GOAL MET    3) Improved posture in sitting and standing  GOAL MET    4) Independent with HEP  GOAL MET    New Goals for strength and functional mobility will be established as protocol allows

## 2024-01-03 ENCOUNTER — OFFICE VISIT (OUTPATIENT)
Dept: ORTHOPEDIC SURGERY | Facility: CLINIC | Age: 59
End: 2024-01-03
Payer: COMMERCIAL

## 2024-01-03 VITALS — HEIGHT: 69 IN | WEIGHT: 194 LBS | BODY MASS INDEX: 28.73 KG/M2

## 2024-01-03 DIAGNOSIS — S46.012D TRAUMATIC COMPLETE TEAR OF LEFT ROTATOR CUFF, SUBSEQUENT ENCOUNTER: Primary | ICD-10-CM

## 2024-01-03 PROCEDURE — 99024 POSTOP FOLLOW-UP VISIT: CPT | Performed by: STUDENT IN AN ORGANIZED HEALTH CARE EDUCATION/TRAINING PROGRAM

## 2024-01-03 NOTE — PROGRESS NOTES
PRIMARY CARE PHYSICIAN: Indy Del Cid MD    ORTHOPAEDIC POSTOPERATIVE VISIT    ASSESSMENT & PLAN    Impression: 58 y.o. male 3 months postop s/p left shoulder arthroscopy, rotator cuff repair, biceps tenodesis and subacromial decompression 9/29/23.    Plan:   Overall he is doing well.  He will continue working with physical therapy through the postoperative protocol for the above.  He will progress towards active motion and strengthening.  He understands his postoperative precautions.    I reviewed their postoperative timeline and plan with them. They understand the postoperative precautions and the treatment plan going forward.     Follow-Up: Patient will follow-up 6 weeks, no x-rays    At the end of the visit, all questions were answered in full. The patient is in agreement with the plan and recommendations. They will call the office with any questions/concerns.      Note dictated with [employer] software. Completed without full typed error editing and sent to avoid delay.      SUBJECTIVE  CHIEF COMPLAINT: Postop    HPI: Kalyan Ramon is a 58 y.o. patient. Kalyan Ramon is now 3 months postop status post left shoulder arthroscopy, rotator cuff repair, biceps tenodesis and subacromial decompression 9/29/23 Overall he is doing well.  He is adhering to his postoperative precautions.  He has no complaints at this time.    REVIEW OF SYSTEMS  Constitutional: See HPI for pain assessment, No significant weight loss, recent trauma  Cardiovascular: No chest pain, shortness of breath  Respiratory: No difficulty breathing, cough  Gastrointestinal: No nausea, vomiting, diarrhea, constipation  Musculoskeletal: Noted in HPI, positive for pain, restricted motion, stiffness  Integumentary: No rashes, easy bruising, redness   Neurological: no numbness or tingling in extremities, no gait disturbances   Psychiatric: No mood changes, memory changes, social issues  Heme/Lymph: no excessive swelling, easy  "bruising, excessive bleeding  ENT: No hearing changes  Eyes: No vision changes    CURRENT MEDICATIONS:   Current Outpatient Medications   Medication Sig Dispense Refill    acetaminophen (Tylenol) 500 mg tablet Take 2 tablets (1,000 mg) by mouth every 8 hours.      amLODIPine (Norvasc) 10 mg tablet Take 1 tablet (10 mg) by mouth once daily. 30 tablet 11    cholecalciferol (Vitamin D-3) 25 MCG (1000 UT) tablet Take 1 tablet (25 mcg) by mouth once daily.      eplerenone (Inspra) 25 mg tablet Take 1 tablet (25 mg) by mouth once daily. 30 tablet 11    lisinopriL-hydrochlorothiazide 20-25 mg tablet Take 1 tablet by mouth once daily. 30 tablet 11    meclizine (Antivert) 12.5 mg tablet Take 1 tablet (12.5 mg) by mouth 3 times a day as needed for dizziness. 30 tablet 5    metoprolol succinate XL (Toprol-XL) 100 mg 24 hr tablet Take 1 tablet (100 mg) by mouth once daily. 30 tablet 11    rosuvastatin (Crestor) 20 mg tablet Take 1 tablet (20 mg) by mouth once daily. 30 tablet 11     No current facility-administered medications for this visit.        OBJECTIVE    PHYSICAL EXAM      12/19/2022     8:13 AM 3/2/2023     2:37 PM 7/25/2023    11:38 AM 9/6/2023    11:29 AM 10/16/2023     2:23 PM 11/20/2023    12:49 PM 12/12/2023     7:54 AM   Vitals   Systolic 140 130 132    138   Diastolic 78 80 78    80   Heart Rate 76 97 90    94   Temp 36.5 °C (97.7 °F)  37 °C (98.6 °F)       Resp 17  18    16   Height (in) 1.727 m (5' 8\") 1.727 m (5' 8\") 1.753 m (5' 9\") 1.727 m (5' 8\") 1.753 m (5' 9\") 1.753 m (5' 9\") 1.753 m (5' 9\")   Weight (lb) 193 193 194.4 190 190 190 194   BMI 29.35 kg/m2 29.35 kg/m2 28.71 kg/m2 28.89 kg/m2 28.06 kg/m2 28.06 kg/m2 28.65 kg/m2   BSA (m2) 2.05 m2 2.05 m2 2.07 m2 2.03 m2 2.05 m2 2.05 m2 2.07 m2   Visit Report   Report  Report Report Report      There is no height or weight on file to calculate BMI.    General: Well-appearing, no acute distress    Skin intact bilateral upper and lower extremities  No " erythema  No warmth    Detailed examination of the left shoulder demonstrates:  Surgical incision(s) well-healed  No erythema or warmth  No ecchymosis or soft tissue swelling  Biceps contour normal  Shoulder range of motion: Forward flexion to 140, ER 20  Good resistance with Jobes, ER and belly press testing  Upper extremity motor grossly intact  C5-T1 sensation intact bilaterally  2+ radial pulses bilaterally  Warm and well-perfused, brisk capillary refill    IMAGING:   No new imaging      Pete Ramirez MD  Attending Surgeon    Sports Medicine Orthopaedic Surgery  [facility] [place]  [facility] [employer]

## 2024-01-04 ENCOUNTER — TREATMENT (OUTPATIENT)
Dept: PHYSICAL THERAPY | Facility: HOSPITAL | Age: 59
End: 2024-01-04
Payer: COMMERCIAL

## 2024-01-04 DIAGNOSIS — S46.012D TRAUMATIC COMPLETE TEAR OF LEFT ROTATOR CUFF, SUBSEQUENT ENCOUNTER: ICD-10-CM

## 2024-01-04 PROCEDURE — 97140 MANUAL THERAPY 1/> REGIONS: CPT | Mod: GP,CQ | Performed by: PHYSICAL THERAPY ASSISTANT

## 2024-01-04 PROCEDURE — 97110 THERAPEUTIC EXERCISES: CPT | Mod: GP,CQ | Performed by: PHYSICAL THERAPY ASSISTANT

## 2024-01-04 ASSESSMENT — PAIN SCALES - GENERAL: PAINLEVEL_OUTOF10: 1

## 2024-01-04 ASSESSMENT — PAIN - FUNCTIONAL ASSESSMENT: PAIN_FUNCTIONAL_ASSESSMENT: 0-10

## 2024-01-04 NOTE — PROGRESS NOTES
"Physical Therapy    Physical Therapy Treatment    Patient Name: Kalyan Ramon  MRN: [id number]  Today's Date: 1/4/2024  Time Calculation  Start Time: 1500  Stop Time: 1550  Time Calculation (min): 50 min  VISIT# 10/1      Current Problem  1. Traumatic complete tear of left rotator cuff, subsequent encounter  Follow Up In Physical Therapy            Subjective      Pt reports soreness/tightness persists. Worked 12 hour shift today. Saw Dr. Ramirez yesterday and he is pleased with progress.   Precautions  Precautions  STEADI Fall Risk Score (The score of 4 or more indicates an increased risk of falling): 0  Precautions Comment: See RTC protocol       Pain  Pain Assessment: 0-10  Pain Score: 1    Objective   L shldr flex to 130 degrees.  Treatments:     EXERCISES          Date 12/15/23 12/19/23    12-21-23 12/26/2023   1/4/24   VISIT# #6 #7 8 #9 #10/1              Pendulums          AROM forearm, wrist and hand           strength/squeezes          Scapular squeezes, depressions and shrugs           Self assist wall slides flex      10x HEP   Pulleys  flexion 3 min 3 min 3 min 3 min 3 min   Pulley abduction/scaption 3 min 3 min 3 min 3 min 3 min   Pulley  IR            Finger Ladder (after 1/5)          Shoulder ARC (after 1/5)        Wand:  flexion/abduction/ER 1 x 10 flex, ER   10x 2 20x 20x   UE [place]   seated Forward Flexion                      Circumduction CW/CCW                       Seated scaption                      Seated overhead press 20x  20x 20x  20x      Self stretch  Levator scap                      Upper trap   10 sec hold x5  10 sec hold x5  10 sec hold x5 each 15\" x 3 each    Supine gentle isometrics       5\" x 5 each advance to stand next visit               PROM  left shoulder      FF,ABD and ER with in comfortable range, sidelying gentle scap mobes, trigger point release to left upper trap, levator, pec, reassess ROM 15 min 25 min 15 20 mins 20 min                      Cold Pack/ Moist " Heat  E Stim   as needed 10 min   Heat to neck with ex    Ice to shoudler post tx  Heat to neck with ex    Ice to shoudler post tx               surgery on 9/29/23.           10 wks 12/8/23          14 wks 1/5/24          HEP              Assessment:   Initiated supine gentle isometric as pt is  in Phase 3 tomorrow, no increased symptoms with trial, will advance next session.   Heat to neck when supine while doing PROM to shouldrer. Pt needs verbal  cues to work on relaxing shoulder.   OP EDUCATION:     Plan:   Advance to Phase 3 next visit.    Goals:  Active       PT Problem       PT Goals       Start:  11/29/23    Expected End:  02/29/24       In 6-8 weeks, pt will demonstrate:    1) Decreased pain to 2/10 max left shoulder, to allow greater ease with ADL, improved sleep  GOAL PROGRESSING    2) Increased PROM by 20 degrees all planes per protocol  GOAL MET    3) Improved posture in sitting and standing  GOAL MET    4) Independent with HEP  GOAL MET    New Goals for strength and functional mobility will be established as protocol allows

## 2024-01-08 ENCOUNTER — TREATMENT (OUTPATIENT)
Dept: PHYSICAL THERAPY | Facility: HOSPITAL | Age: 59
End: 2024-01-08
Payer: COMMERCIAL

## 2024-01-08 DIAGNOSIS — S46.012D TRAUMATIC COMPLETE TEAR OF LEFT ROTATOR CUFF, SUBSEQUENT ENCOUNTER: ICD-10-CM

## 2024-01-08 DIAGNOSIS — S46.019D TRAUMATIC COMPLETE TEAR OF ROTATOR CUFF, SUBSEQUENT ENCOUNTER: Primary | ICD-10-CM

## 2024-01-08 PROCEDURE — 97140 MANUAL THERAPY 1/> REGIONS: CPT | Mod: GP,CQ

## 2024-01-08 PROCEDURE — 97110 THERAPEUTIC EXERCISES: CPT | Mod: GP,CQ

## 2024-01-08 ASSESSMENT — PAIN - FUNCTIONAL ASSESSMENT: PAIN_FUNCTIONAL_ASSESSMENT: 0-10

## 2024-01-08 ASSESSMENT — PAIN SCALES - GENERAL: PAINLEVEL_OUTOF10: 1

## 2024-01-08 NOTE — PROGRESS NOTES
"Physical Therapy    Physical Therapy Treatment    Patient Name: Kalyan Ramon  MRN: [id number]  : 1965   Today's Date: 2024  Time Calculation  Start Time: 0200  Stop Time: 0245  Time Calculation (min): 45 min  Visit Number: #2/20 for     Current Problem  Problem List Items Addressed This Visit             ICD-10-CM    Tear of left rotator cuff M75.102    Traumatic complete tear of rotator cuff, subsequent encounter - Primary S46.019D        Subjective   General  Pt states he is doing light duty at work   Precautions  Precautions  Precautions Comment: See RTC protocol    Pain  Pain Assessment: 0-10  Pain Score: 1      Objective       Treatments:     EXERCISES         Date 23   VISIT# #7 8 #9 #10/1 #11             Pendulums         AROM forearm, wrist and hand          strength/squeezes         Scapular squeezes, depressions and shrugs          Self assist wall slides flex    10x HEP    Pulleys  flexion 3 min 3 min 3 min 3 min 3 min   Pulley abduction/scaption 3 min 3 min 3 min 3 min 3 min   Pulley  IR       3 min           Finger Ladder flexion       x5                          Scaption      x5           Shoulder ARC         Wand:  flexion/abduction/ER   10x 2 20x 20x 2x10ea abd/ ER seated, 2x10 flex supine    UE [place]   seated Forward Flexion                      Circumduction CW/CCW                       Seated scaption                      Seated overhead press 20x  20x       Self stretch  Levator scap                      Upper trap 10 sec hold x5  10 sec hold x5  10 sec hold x5 each 15\" x 3 each     Supine gentle isometrics flex/abd/IR/ER/ext      5\" x 5 each advance to stand next visit  5sec x10 ea standing              PROM  left shoulder      FF,ABD and ER with in comfortable range, sidelying gentle scap mobes, trigger point release to left upper trap, levator, pec, reassess ROM 25 min 15 20 mins 20 min 15 min             Cold Pack/ Moist " Heat  E Stim   as needed   Heat to neck with ex    Ice to shoudler post tx  Heat to neck with ex    Ice to shoudler post tx               surgery on 9/29/23.          14 wks 1/5/24         18wks 2/2/24        HEP             Assessment:   Pt tolerated all exercises well with minimal difficulty reporting no increase in pain throughout session. Pt reports his L shoulder feels stretched at end of session.     Plan:   Continue to progress L shoulder ROM and strength as protocol allows     Goals:  Active       PT Problem       PT Goals       Start:  11/29/23    Expected End:  02/29/24       In 6-8 weeks, pt will demonstrate:    1) Decreased pain to 2/10 max left shoulder, to allow greater ease with ADL, improved sleep  GOAL PROGRESSING    2) Increased PROM by 20 degrees all planes per protocol  GOAL MET    3) Improved posture in sitting and standing  GOAL MET    4) Independent with HEP  GOAL MET    New Goals for strength and functional mobility will be established as protocol allows

## 2024-01-10 ENCOUNTER — TREATMENT (OUTPATIENT)
Dept: PHYSICAL THERAPY | Facility: HOSPITAL | Age: 59
End: 2024-01-10
Payer: COMMERCIAL

## 2024-01-10 DIAGNOSIS — S46.012D TRAUMATIC COMPLETE TEAR OF LEFT ROTATOR CUFF, SUBSEQUENT ENCOUNTER: ICD-10-CM

## 2024-01-10 PROCEDURE — 97140 MANUAL THERAPY 1/> REGIONS: CPT | Mod: GP,CQ

## 2024-01-10 PROCEDURE — 97110 THERAPEUTIC EXERCISES: CPT | Mod: GP,CQ

## 2024-01-10 NOTE — PROGRESS NOTES
"Physical Therapy    Physical Therapy Treatment    Patient Name: Kalyan Ramon  MRN: [id number]  : 1965   Today's Date: 1/10/2024  Time Calculation  Start Time: 230  Stop Time: 315  Time Calculation (min): 45 min  Visit #12    Current Problem  1. Traumatic complete tear of left rotator cuff, subsequent encounter  Follow Up In Physical Therapy            Subjective   Pt states he feels good no pain       Precautions     Precautions Comment: See RTC protocol      Pain   0/10    Objective          Treatments:  EXERCISES        Date 12-21-23 2023   1/4/24 2024 1/10/24   VISIT# 8 #9 #10/1 #11 #12            Pendulums        AROM forearm, wrist and hand         strength/squeezes        Scapular squeezes, depressions and shrugs         Self assist wall slides flex  10x HEP     Pulleys  flexion 3 min 3 min 3 min 3 min 3 min   Pulley abduction/scaption 3 min 3 min 3 min 3 min 3 min   Pulley  IR     3 min 3 min           Finger Ladder flexion     x5 x5                          Scaption     x5 x5           Shoulder ARC         Wand:  flexion/abduction/ER 10x 2 20x 20x 2x10ea abd/ ER seated, 2x10 flex supine  2x10ea abd/ ER seated, 2x10 flex supine    UE [place]   seated Forward Flexion                      Circumduction CW/CCW                       Seated scaption                      Seated overhead press        Self stretch  Levator scap                      Upper trap  10 sec hold x5 each 15\" x 3 each      Supine gentle isometrics flex/abd/IR/ER/ext    5\" x 5 each advance to stand next visit  5sec x10 ea standing  5 sec x10 ea standing             PROM  left shoulder      FF,ABD and ER with in comfortable range, sidelying gentle scap mobes, trigger point release to left upper trap, levator, pec, reassess ROM 15 20 mins 20 min 15 min 15min            Cold Pack/ Moist Heat  E Stim   as needed Heat to neck with ex    Ice to shoudler post tx  Heat to neck with ex    Ice to shoudler post tx               " surgery on 9/29/23.         14 wks 1/5/24        18wks 2/2/24        HEP          Assessment:  Pt reports no pain with exercises or stretches. Pt states he has tightness at end of ROM.        Plan:   Cont per protocol to improve ROM and strength.     OP EDUCATION:       Goals:  Active       PT Problem       PT Goals       Start:  11/29/23    Expected End:  02/29/24       In 6-8 weeks, pt will demonstrate:    1) Decreased pain to 2/10 max left shoulder, to allow greater ease with ADL, improved sleep  GOAL PROGRESSING    2) Increased PROM by 20 degrees all planes per protocol  GOAL MET    3) Improved posture in sitting and standing  GOAL MET    4) Independent with HEP  GOAL MET    New Goals for strength and functional mobility will be established as protocol allows              .

## 2024-01-15 ENCOUNTER — APPOINTMENT (OUTPATIENT)
Dept: PHYSICAL THERAPY | Facility: HOSPITAL | Age: 59
End: 2024-01-15
Payer: COMMERCIAL

## 2024-01-16 ENCOUNTER — TREATMENT (OUTPATIENT)
Dept: PHYSICAL THERAPY | Facility: HOSPITAL | Age: 59
End: 2024-01-16
Payer: COMMERCIAL

## 2024-01-16 DIAGNOSIS — S46.019D TRAUMATIC COMPLETE TEAR OF ROTATOR CUFF, SUBSEQUENT ENCOUNTER: Primary | ICD-10-CM

## 2024-01-16 DIAGNOSIS — S46.012D TRAUMATIC COMPLETE TEAR OF LEFT ROTATOR CUFF, SUBSEQUENT ENCOUNTER: ICD-10-CM

## 2024-01-16 PROCEDURE — 97140 MANUAL THERAPY 1/> REGIONS: CPT | Mod: GP,CQ

## 2024-01-16 PROCEDURE — 97110 THERAPEUTIC EXERCISES: CPT | Mod: GP,CQ

## 2024-01-16 ASSESSMENT — PAIN - FUNCTIONAL ASSESSMENT: PAIN_FUNCTIONAL_ASSESSMENT: 0-10

## 2024-01-16 ASSESSMENT — PAIN SCALES - GENERAL: PAINLEVEL_OUTOF10: 2

## 2024-01-16 NOTE — PROGRESS NOTES
"Physical Therapy    Physical Therapy Treatment    Patient Name: Kalyan Ramon  MRN: [id number]  : 1965   Today's Date: 2024  Time Calculation  Start Time: 0  Stop Time: 446  Time Calculation (min): 46 min  Visit Number:  for     Current Problem  Problem List Items Addressed This Visit             ICD-10-CM    Tear of left rotator cuff M75.102    Traumatic complete tear of rotator cuff, subsequent encounter - Primary S46.019D        Subjective   General   Pt states he has a pain in the top of his L shoulder that feels like it gets tighter when he tilts his head to the R  Precautions  Precautions  Precautions Comment: See RTC protocol    Pain  Pain Assessment: 0-10  Pain Score: 2  Pain Location: Shoulder  Pain Radiating Towards: left      Objective   PROM L shoulder flexion 121*  PROM L shoulder scaption 145*    Treatments:     EXERCISES       Date 1/4/24 2024 1/10/24 1/16/24   VISIT# #10/1 #11 #12 #13           Pendulums       AROM forearm, wrist and hand        strength/squeezes       Scapular squeezes, depressions and shrugs        Self assist wall slides flex HEP      Pulleys  flexion 3 min 3 min 3 min 3 min   Pulley abduction/scaption 3 min 3 min 3 min 3 min   Pulley  IR   3 min 3 min 3 min          Finger Ladder flexion   x5 x5 x5                          Scaption   x5 x5 x5          Shoulder ARC        Wand:  flexion/abduction/ER 20x 2x10ea abd/ ER seated, 2x10 flex supine  2x10ea abd/ ER seated, 2x10 flex supine  2x10 abd/ER  Seated, 2x10 flex supine   UE [place]   seated Forward Flexion                      Circumduction CW/CCW                       Seated scaption                      Seated overhead press       Self stretch  Levator scap                      Upper trap 15\" x 3 each       Supine gentle isometrics flex/abd/IR/ER/ext  5\" x 5 each advance to stand next visit  5sec x10 ea standing  5 sec x10 ea standing  5sec x10ea standing           PROM  left shoulder      " FF,ABD and ER with in comfortable range, sidelying gentle scap mobes, trigger point release to left upper trap, levator, pec, reassess ROM 20 min 15 min 15min 15 min           Cold Pack/ Moist Heat  E Stim   as needed Heat to neck with ex    Ice to shoudler post tx               surgery on 9/29/23.        14 wks 1/5/24       18wks 2/2/24       HEP         Assessment:   Pt tolerated all exercises well with minimal difficulty reporting no increase in pain throughout session. Pt demonstrates progress towards goals this date.     Plan:   Pt to come 1x a week until 2/2 for next phase of protocol.     Goals:  Active       PT Problem       PT Goals       Start:  11/29/23    Expected End:  02/29/24       In 6-8 weeks, pt will demonstrate:    1) Decreased pain to 2/10 max left shoulder, to allow greater ease with ADL, improved sleep  GOAL PROGRESSING    2) Increased PROM by 20 degrees all planes per protocol  GOAL MET    3) Improved posture in sitting and standing  GOAL MET    4) Independent with HEP  GOAL MET    New Goals for strength and functional mobility will be established as protocol allows

## 2024-01-18 ENCOUNTER — APPOINTMENT (OUTPATIENT)
Dept: PHYSICAL THERAPY | Facility: HOSPITAL | Age: 59
End: 2024-01-18
Payer: COMMERCIAL

## 2024-01-23 ENCOUNTER — TREATMENT (OUTPATIENT)
Dept: PHYSICAL THERAPY | Facility: HOSPITAL | Age: 59
End: 2024-01-23
Payer: COMMERCIAL

## 2024-01-23 DIAGNOSIS — S46.012D TRAUMATIC COMPLETE TEAR OF LEFT ROTATOR CUFF, SUBSEQUENT ENCOUNTER: ICD-10-CM

## 2024-01-23 PROCEDURE — 97140 MANUAL THERAPY 1/> REGIONS: CPT | Mod: GP | Performed by: PHYSICAL THERAPIST

## 2024-01-23 PROCEDURE — 97110 THERAPEUTIC EXERCISES: CPT | Mod: GP | Performed by: PHYSICAL THERAPIST

## 2024-01-23 ASSESSMENT — PAIN SCALES - GENERAL: PAINLEVEL_OUTOF10: 1

## 2024-01-23 ASSESSMENT — PAIN - FUNCTIONAL ASSESSMENT: PAIN_FUNCTIONAL_ASSESSMENT: 0-10

## 2024-01-23 NOTE — PROGRESS NOTES
"Physical Therapy    Physical Therapy Treatment    Patient Name: Kalyan Ramon  MRN: [id number]  Today's Date: 1/23/2024  Time Calculation  Start Time: 1420  Stop Time: 1500  Time Calculation (min): 40 min  Visit # 14  Assessment: pt progressing with protocol, he is still limited in AROM flexion, IR and ER     Plan: will begin isotonic strengthening on or around 2/2/24       Current Problem  1. Traumatic complete tear of left rotator cuff, subsequent encounter  Follow Up In Physical Therapy              Subjective    Precautions  Precautions  Precautions Comment: see RTC protocol  Pain  Pain Assessment  Pain Assessment: 0-10  Pain Score: 1  Pain Location: Shoulder  Pain Radiating Towards: left    Objective    Shoulder AROM/PROM  WFL unless documented below   Flexion Abduction IR ER Fxn IR reach Fxn ER reach   RIGHT         LEFT 105/135 (in standing 90/110 in standing   L5 C2      Outcome Measures:  Other Measures  Disability of Arm Shoulder Hand (DASH): 25.00  Treatments:  EXERCISES        Date 1/4/24 1/8/2024 1/10/24 1/16/24 1/23/2024     VISIT# #10/1 #11 #12 #13 #14            Pendulums        AROM forearm, wrist and hand         strength/squeezes        Scapular squeezes, depressions and shrugs         Self assist wall slides flex HEP       Pulleys  flexion 3 min 3 min 3 min 3 min 3 min   Pulley abduction/scaption 3 min 3 min 3 min 3 min 3 min   Pulley  IR   3 min 3 min 3 min 3 min/           Finger Ladder flexion   x5 x5 x5 x5                          Scaption   x5 x5 x5 x5           Shoulder ARC         Wand:  flexion/abduction/ER 20x 2x10ea abd/ ER seated, 2x10 flex supine  2x10ea abd/ ER seated, 2x10 flex supine  2x10 abd/ER  Seated, 2x10 flex supine 2x 10 supine all planes   UE [place]   seated Forward Flexion                      Circumduction CW/CCW                       Seated scaption                      Seated overhead press        Self stretch  Levator scap                      Upper trap 15\" x 3 " "each        Supine gentle isometrics flex/abd/IR/ER/ext  5\" x 5 each advance to stand next visit  5sec x10 ea standing  5 sec x10 ea standing  5sec x10ea standing 5 secm 10 each standing            PROM  left shoulder      FF,ABD and ER with in comfortable range, sidelying gentle scap mobes, trigger point release to left upper trap, levator, pec, reassess ROM 20 min 15 min 15min 15 min             Cold Pack/ Moist Heat  E Stim   as needed Heat to neck with ex    Ice to shoudler post tx                 surgery on 9/29/23.         14 wks 1/5/24        18wks 2/2/24  to begin phase IV        HEP          OP EDUCATION: reviewed protocol timeline and HEP     Goals:  Active       PT Problem       PT Goals       Start:  11/29/23    Expected End:  02/29/24       In 6-8 weeks, pt will demonstrate:    1) Decreased pain to 2/10 max left shoulder, to allow greater ease with ADL, improved sleep  GOAL PROGRESSING    2) Increased PROM by 20 degrees all planes per protocol  GOAL MET    3) Improved posture in sitting and standing  GOAL MET    4) Independent with HEP  GOAL MET    New Goals for strength and functional mobility will be established as protocol allows           "

## 2024-01-25 ENCOUNTER — APPOINTMENT (OUTPATIENT)
Dept: PHYSICAL THERAPY | Facility: HOSPITAL | Age: 59
End: 2024-01-25
Payer: COMMERCIAL

## 2024-02-01 ENCOUNTER — TREATMENT (OUTPATIENT)
Dept: PHYSICAL THERAPY | Facility: HOSPITAL | Age: 59
End: 2024-02-01
Payer: COMMERCIAL

## 2024-02-01 DIAGNOSIS — S46.019D TRAUMATIC COMPLETE TEAR OF ROTATOR CUFF, SUBSEQUENT ENCOUNTER: Primary | ICD-10-CM

## 2024-02-01 DIAGNOSIS — S46.012D TRAUMATIC COMPLETE TEAR OF LEFT ROTATOR CUFF, SUBSEQUENT ENCOUNTER: ICD-10-CM

## 2024-02-01 PROCEDURE — 97140 MANUAL THERAPY 1/> REGIONS: CPT | Mod: GP,CQ

## 2024-02-01 PROCEDURE — 97110 THERAPEUTIC EXERCISES: CPT | Mod: GP,CQ

## 2024-02-01 ASSESSMENT — PAIN SCALES - GENERAL: PAINLEVEL_OUTOF10: 0 - NO PAIN

## 2024-02-01 ASSESSMENT — PAIN - FUNCTIONAL ASSESSMENT: PAIN_FUNCTIONAL_ASSESSMENT: 0-10

## 2024-02-01 NOTE — PROGRESS NOTES
Physical Therapy    Physical Therapy Treatment    Patient Name: Kalyan Ramon  MRN: [id number]  : 1965   Today's Date: 2024  Time Calculation  Start Time: 230  Stop Time: 310  Time Calculation (min): 40 min  Visit Number:  for     Current Problem  Problem List Items Addressed This Visit             ICD-10-CM    Tear of left rotator cuff M75.102    Traumatic complete tear of rotator cuff, subsequent encounter - Primary S46.019D    Relevant Orders    Follow Up In Physical Therapy        Subjective   General    Precautions  Precautions  Precautions Comment: see RTC protocol    Pain  Pain Assessment: 0-10  Pain Score: 0 - No pain  Pain Location: Shoulder      Objective     Treatments:     EXERCISES       Date 1/10/24 1/16/24 2024   2024   VISIT# #12 #13 #14 #15                                Scapular squeezes, depressions and shrugs        Self assist wall slides flex       Pulleys  flexion 3 min 3 min 3 min 3 min   [place]ley abduction/scaption 3 min 3 min 3 min 3 min   Pulley  IR  3 min 3 min 3 min 3 min          Finger Ladder flexion  x5 x5 x5                           Scaption  x5 x5 x5           Shoulder ARC        Wand:  flexion/abduction/ER 2x10ea abd/ ER seated, 2x10 flex supine  2x10 abd/ER  Seated, 2x10 flex supine 2x 10 supine all planes Standing: 2x10 all planes    AROM seated flexion        AROM seated scaption               Supine gentle isometrics flex/abd/IR/ER/ext  5 sec x10 ea standing  5sec x10ea standing 5 secx 10 each standing HEP            PROM  left shoulder      FF,ABD and ER with in comfortable range, sidelying gentle scap mobes, trigger point release to left upper trap, levator, pec, reassess ROM 15min 15 min  15 min           Tband: rows     Red x10               P[place]owns     Red 2x10                      Ball on wall flex/ext, abd/add, cw/ccw     X20ea            surgery on 23.        14 wks 24       18wks 24  to begin phase IV       HEP            Assessment:   Pt tolerated all exercises well with minimal difficulty reporting no increase in pain at end of session.    Plan:   Pt progressing into strengthening exercises     Goals:  Active       PT Problem       PT Goals       Start:  11/29/23    Expected End:  02/29/24       In 6-8 weeks, pt will demonstrate:    1) Decreased pain to 2/10 max left shoulder, to allow greater ease with ADL, improved sleep  GOAL PROGRESSING    2) Increased PROM by 20 degrees all planes per protocol  GOAL MET    3) Improved posture in sitting and standing  GOAL MET    4) Independent with HEP  GOAL MET    New Goals for strength and functional mobility will be established as protocol allows

## 2024-02-06 ENCOUNTER — TREATMENT (OUTPATIENT)
Dept: PHYSICAL THERAPY | Facility: HOSPITAL | Age: 59
End: 2024-02-06
Payer: COMMERCIAL

## 2024-02-06 DIAGNOSIS — S46.012D TRAUMATIC COMPLETE TEAR OF LEFT ROTATOR CUFF, SUBSEQUENT ENCOUNTER: ICD-10-CM

## 2024-02-06 PROCEDURE — 97110 THERAPEUTIC EXERCISES: CPT | Mod: GP | Performed by: PHYSICAL THERAPIST

## 2024-02-06 PROCEDURE — 97140 MANUAL THERAPY 1/> REGIONS: CPT | Mod: GP | Performed by: PHYSICAL THERAPIST

## 2024-02-06 ASSESSMENT — PAIN - FUNCTIONAL ASSESSMENT: PAIN_FUNCTIONAL_ASSESSMENT: 0-10

## 2024-02-06 ASSESSMENT — PAIN SCALES - GENERAL: PAINLEVEL_OUTOF10: 2

## 2024-02-06 NOTE — PROGRESS NOTES
Physical Therapy    Physical Therapy Treatment    Patient Name: Kalyan Ramon  MRN: [id number]  Today's Date: 2/6/2024  Time Calculation  Start Time: 1415  Stop Time: 1500  Time Calculation (min): 45 min  Assessment: active elevation remains limited     Plan: continue with RTC protocol, add sidelying ER and serratus punch ups       Current Problem  1. Traumatic complete tear of left rotator cuff, subsequent encounter  Follow Up In Physical Therapy              Subjective  2/`10, notes lack of AROM into elevation  Precautions  Precautions  Precautions Comment: see RTC protocol  Pain  Pain Assessment  Pain Assessment: 0-10  Pain Score: 2  Pain Location: Shoulder    Objective  AROM to 95 degrees, PROM approx 120 with hard end feel  Treatments:  EXERCISES        Date 1/10/24 1/16/24 1/23/2024   2/1/2024 2/6/2024     VISIT# #12 #13 #14 #15 #16                                    Scapular squeezes, depressions and shrugs         Self assist wall slides flex        Pulleys  flexion 3 min 3 min 3 min 3 min 3 min   Pulley abduction/scaption 3 min 3 min 3 min 3 min 3 min   Pulley  IR  3 min 3 min 3 min 3 min 3 min           Finger Ladder flexion  x5 x5 x5  x5                          Scaption  x5 x5 x5  x5           Shoulder ARC         Wand:  flexion/abduction/ER 2x10ea abd/ ER seated, 2x10 flex supine  2x10 abd/ER  Seated, 2x10 flex supine 2x 10 supine all planes Standing: 2x10 all planes     AROM seated flexion         AROM seated scaption                 Supine gentle isometrics flex/abd/IR/ER/ext  5 sec x10 ea standing  5sec x10ea standing 5 secx 10 each standing HEP              PROM  left shoulder      FF,ABD and ER with in comfortable range, sidelying gentle scap mobes, trigger point release to left upper trap, levator, pec, reassess ROM 15min 15 min  15 min 15 mins            Tband: rows     Red x10 Red 20x               Pulldowns     Red 2x10 Red 20x               IR     Yellow 20x               Er     Yellow 20x    Ball on wall flex/ext, abd/add, cw/ccw     X20ea              surgery on 9/29/23.         14 wks 1/5/24        18wks 2/2/24  to begin phase IV        HEP          OP EDUCATION:     Goals:  Active       PT Problem       PT Goals       Start:  11/29/23    Expected End:  02/29/24       In 6-8 weeks, pt will demonstrate:    1) Decreased pain to 2/10 max left shoulder, to allow greater ease with ADL, improved sleep  GOAL PROGRESSING    2) Increased PROM by 20 degrees all planes per protocol  GOAL MET    3) Improved posture in sitting and standing  GOAL MET    4) Independent with HEP  GOAL MET    New Goals for strength and functional mobility will be established as protocol allows

## 2024-02-08 ENCOUNTER — TREATMENT (OUTPATIENT)
Dept: PHYSICAL THERAPY | Facility: HOSPITAL | Age: 59
End: 2024-02-08
Payer: COMMERCIAL

## 2024-02-08 DIAGNOSIS — S46.012D TRAUMATIC COMPLETE TEAR OF LEFT ROTATOR CUFF, SUBSEQUENT ENCOUNTER: ICD-10-CM

## 2024-02-08 PROCEDURE — 97140 MANUAL THERAPY 1/> REGIONS: CPT | Mod: GP,CQ

## 2024-02-08 PROCEDURE — 97110 THERAPEUTIC EXERCISES: CPT | Mod: GP,CQ

## 2024-02-08 NOTE — PROGRESS NOTES
Physical Therapy    Physical Therapy Treatment    Patient Name: Kalyan Ramon  MRN: [id number]  : 1965   Today's Date: 2024  Time Calculation  Start Time: 215  Stop Time: 0300  Time Calculation (min): 45 min  Visit #17      Current Problem  1. Traumatic complete tear of left rotator cuff, subsequent encounter  Follow Up In Physical Therapy            Subjective   Pt states he has some pain every once in awhile nothing consistent.        Precautions   PROTOCOL        Pain   0/10    Objective    PROM L shld flex 140*   Added UE bike       Treatments:  EXERCISES        Date 24   VISIT# #13 #14 #15 #16 #17                                    Scapular squeezes, depressions and shrugs         Self assist wall slides flex        Pulleys  flexion 3 min 3 min 3 min 3 min 3 min   Pulley abduction/scaption 3 min 3 min 3 min 3 min 3 min   Pulley  IR  3 min 3 min 3 min 3 min 3 min           Finger Ladder flexion  x5 x5  x5                           Scaption  x5 x5  x5    Nu-Step      L2 10'   Shoulder ARC         Wand:  flexion/abduction/ER 2x10 abd/ER  Seated, 2x10 flex supine 2x 10 supine all planes Standing: 2x10 all planes      AROM seated flexion         AROM seated scaption                 Supine gentle isometrics flex/abd/IR/ER/ext  5sec x10ea standing 5 secx 10 each standing HEP               PROM  left shoulder      FF,ABD and ER with in comfortable range, sidelying gentle scap mobes, trigger point release to left upper trap, levator, pec, reassess ROM 15 min  15 min 15 mins 10'            Tband: rows    Red x10 Red 20x Red 20x               Pulldowns    Red 2x10 Red 20x Red 20x               IR    Yellow 20x Yellow 20x               Er    Yellow 20x Yellow 20x   Ball on wall flex/ext, abd/add, cw/ccw    X20ea               surgery on 23.         14 wks 24        18wks 24  to begin phase IV        HEP          Assessment:  Pt reports pain at and of  ROM and tightness. Pt reports he can feel a catch in his shld with PROM flex at around 90*  then eases up after a few degrees.         Plan:   Cont per protocol to improve strength and ROM    OP EDUCATION:       Goals:  Active       PT Problem       PT Goals       Start:  11/29/23    Expected End:  02/29/24       In 6-8 weeks, pt will demonstrate:    1) Decreased pain to 2/10 max left shoulder, to allow greater ease with ADL, improved sleep  GOAL PROGRESSING    2) Increased PROM by 20 degrees all planes per protocol  GOAL MET    3) Improved posture in sitting and standing  GOAL MET    4) Independent with HEP  GOAL MET    New Goals for strength and functional mobility will be established as protocol allows              .

## 2024-02-13 ENCOUNTER — TREATMENT (OUTPATIENT)
Dept: PHYSICAL THERAPY | Facility: HOSPITAL | Age: 59
End: 2024-02-13
Payer: COMMERCIAL

## 2024-02-13 DIAGNOSIS — S46.019D TRAUMATIC COMPLETE TEAR OF ROTATOR CUFF, SUBSEQUENT ENCOUNTER: ICD-10-CM

## 2024-02-13 PROCEDURE — 97110 THERAPEUTIC EXERCISES: CPT | Mod: GP | Performed by: PHYSICAL THERAPIST

## 2024-02-13 PROCEDURE — 97140 MANUAL THERAPY 1/> REGIONS: CPT | Mod: GP | Performed by: PHYSICAL THERAPIST

## 2024-02-13 ASSESSMENT — PAIN - FUNCTIONAL ASSESSMENT
PAIN_FUNCTIONAL_ASSESSMENT: 0-10
PAIN_FUNCTIONAL_ASSESSMENT: 0-10

## 2024-02-13 ASSESSMENT — PAIN SCALES - GENERAL
PAINLEVEL_OUTOF10: 2
PAINLEVEL_OUTOF10: 2

## 2024-02-13 NOTE — PROGRESS NOTES
Physical Therapy    Physical Therapy Treatment    Patient Name: Kalyan Ramon  MRN: [id number]  Today's Date: 02/13/24  Visit #18          PT Therapeutic Procedures Time Entry  Manual Therapy Time Entry: 15  Therapeutic Exercise Time Entry: 30               Assessment: Making progress with AROM flexion     Plan: Continue a few more sessions to firm up strengthening protocol, reassess all planes of shoulder AROM        Current Problem  1. Traumatic complete tear of rotator cuff, subsequent encounter  Follow Up In Physical Therapy              Subjective  Pt has been more consistent with stretches and holding them longer. Doing ok at work, just driving, not a lot of lifting.   Precautions  Rotator cuff protocol Phase IV     Pain 2/10       Objective  AROM seated shoulder flexion is 115 degrees       Treatments:  EXERCISES         Date 1/16/24 1/23/2024 2/1/2024 2/6/2024 2/8/24 2/14/2024     VISIT# #13 #14 #15 #16 #17 #18                                        Scapular squeezes, depressions and shrugs          Self assist wall slides flex         Pulleys  flexion 3 min 3 min 3 min 3 min 3 min 3 min   Pulley abduction/scaption 3 min 3 min 3 min 3 min 3 min 3 min   Pulley  IR  3 min 3 min 3 min 3 min 3 min 3 min            Finger Ladder flexion  x5 x5  x5                            Scaption  x5 x5  x5     Nu-Step      L2 10' L2 10 mins   Shoulder ARC          Wand:  flexion/abduction/ER 2x10 abd/ER  Seated, 2x10 flex supine 2x 10 supine all planes Standing: 2x10 all planes       AROM seated flexion          AROM seated scaption                   Supine gentle isometrics flex/abd/IR/ER/ext  5sec x10ea standing 5 secx 10 each standing HEP                 PROM  left shoulder      FF,ABD and ER with in comfortable range, sidelying gentle scap mobes, trigger point release to left upper trap, levator, pec, reassess ROM 15 min  15 min 15 mins 10' 15 mins             Tband: rows    Red x10 Red 20x Red 20x Red 20x                Pulldowns    Red 2x10 Red 20x Red 20x Red 20x               IR    Yellow 20x Yellow 20x Red 20x               Er    Yellow 20x Yellow 20x Red 20x   Ball on wall flex/ext, abd/add, cw/ccw    X20ea    30 sec each direction   Supine serratus punch up      20 x, no weight on left   Sidelying Sh. ER      1# 20x on left    surgery on 9/29/23.          14 wks 1/5/24         18wks 2/2/24  to begin phase IV         HEP           OP EDUCATION:Reviewed HEP, added serratus punch, sidelying shoulder ER     Goals:  Active       PT Problem       PT Goals       Start:  11/29/23    Expected End:  02/29/24       In 6-8 weeks, pt will demonstrate:    1) Decreased pain to 2/10 max left shoulder, to allow greater ease with ADL, improved sleep  GOAL PROGRESSING    2) Increased PROM by 20 degrees all planes per protocol  GOAL MET    3) Improved posture in sitting and standing  GOAL MET    4) Independent with HEP  GOAL MET    New Goals for strength and functional mobility will be established as protocol allows

## 2024-02-15 ENCOUNTER — APPOINTMENT (OUTPATIENT)
Dept: PHYSICAL THERAPY | Facility: HOSPITAL | Age: 59
End: 2024-02-15
Payer: COMMERCIAL

## 2024-02-22 ENCOUNTER — TREATMENT (OUTPATIENT)
Dept: PHYSICAL THERAPY | Facility: HOSPITAL | Age: 59
End: 2024-02-22
Payer: COMMERCIAL

## 2024-02-22 DIAGNOSIS — S46.019D TRAUMATIC COMPLETE TEAR OF ROTATOR CUFF, SUBSEQUENT ENCOUNTER: ICD-10-CM

## 2024-02-22 PROCEDURE — 97110 THERAPEUTIC EXERCISES: CPT | Mod: GP | Performed by: PHYSICAL THERAPIST

## 2024-02-22 ASSESSMENT — PAIN - FUNCTIONAL ASSESSMENT: PAIN_FUNCTIONAL_ASSESSMENT: 0-10

## 2024-02-22 ASSESSMENT — PAIN SCALES - GENERAL: PAINLEVEL_OUTOF10: 1

## 2024-02-22 NOTE — PROGRESS NOTES
Physical Therapy    Physical Therapy Treatment/Discharge Summary    Patient Name: Kalyan Ramon  MRN: [id number]  Today's Date: 2/22/2024  Visit #19  Time Calculation  Start Time: 1420  Stop Time: 1500  Time Calculation (min): 40 min     PT Therapeutic Procedures Time Entry  Therapeutic Exercise Time Entry: 40                   Assessment:      Plan:        Current Problem  1. Traumatic complete tear of rotator cuff, subsequent encounter  Follow Up In Physical Therapy              Subjective  pt feels like things are getting better with basic activities of daily living such as opening truck door, reaching up in to cupboard, washing hair, washing his back is still difficult.   Precautions  Precautions  Precautions Comment: rotator cuff protocol  Pain  Pain Assessment  Pain Assessment: 0-10  Pain Score: 1    Objective    Outcome Measures:  Other Measures  Disability of Arm Shoulder Hand (DASH): 18.18  Treatments:  EXERCISES          Date 1/16/24 1/23/2024 2/1/2024 2/6/2024 2/8/24 2/13/2024 2/22/24   VISIT# #13 #14 #15 #16 #17 #18 #19                                            Scapular squeezes, depressions and shrugs           Self assist wall slides flex          Pulleys  flexion 3 min 3 min 3 min 3 min 3 min 3 min 3 min   Pulley abduction/scaption 3 min 3 min 3 min 3 min 3 min 3 min 3 min   Pulley  IR  3 min 3 min 3 min 3 min 3 min 3 min 3 min             Finger Ladder flexion  x5 x5  x5                             Scaption  x5 x5  x5      Nu-Step      L2 10' L2 10 mins L2 10 min   Shoulder ARC           Wand:  flexion/abduction/ER 2x10 abd/ER  Seated, 2x10 flex supine 2x 10 supine all planes Standing: 2x10 all planes        AROM seated flexion           AROM seated scaption                     Supine gentle isometrics flex/abd/IR/ER/ext  5sec x10ea standing 5 secx 10 each standing HEP                   PROM  left shoulder      FF,ABD and ER with in comfortable range, sidelying gentle scap mobes, trigger point  release to left upper trap, levator, pec, reassess ROM 15 min  15 min 15 mins 10' 15 mins               Tband: rows    Red x10 Red 20x Red 20x Red 20x                Pulldowns    Red 2x10 Red 20x Red 20x Red 20x                IR    Yellow 20x Yellow 20x Red 20x                Er    Yellow 20x Yellow 20x Red 20x    Ball on wall flex/ext, abd/add, cw/ccw    X20ea    30 sec each direction    Supine serratus punch up      20 x, no weight on left    Sidelying Sh. ER      1# 20x on left     surgery on 9/29/23.           14 wks 1/5/24          18wks 2/2/24  to begin phase IV          HEP            OP EDUCATION:     Goals:  Active       PT Problem       PT Goals       Start:  11/29/23    Expected End:  02/29/24       In 6-8 weeks, pt will demonstrate:    1) Decreased pain to 2/10 max left shoulder, to allow greater ease with ADL, improved sleep  GOAL PROGRESSING    2) Increased PROM by 20 degrees all planes per protocol  GOAL MET    3) Improved posture in sitting and standing  GOAL MET    4) Independent with HEP  GOAL MET    New Goals for strength and functional mobility will be established as protocol allows

## 2024-03-11 PROBLEM — Z72.0 TOBACCO USE: Status: ACTIVE | Noted: 2024-03-11

## 2024-03-11 NOTE — PROGRESS NOTES
[facility]    Patient Name: Kalyan Ramon  Patient : 1965      Scribe Attestation  By signing my name below, IClaudette Scribe   attest that this documentation has been prepared under the direction and in the presence of Ross Raines DO.      Reason for visit:  This is a 58-year-old male here for follow-up regarding his history of coronary artery calcification, hypertension, dyslipidemia, and tobacco use.     HPI:  This is a 58-year-old male here for follow-up regarding his history of coronary artery calcification, hypertension, dyslipidemia, and tobacco use.  The patient was last evaluated by me in 2023.  At that visit I increased rosuvastatin to 20 mg daily, and asked the patient to follow-up in 1 year and sooner if necessary.  CMP done in 2023 showed normal serum sodium and potassium with a serum creatinine 0.65, ALT of 49, AST of 40, TSH of 1.65, CBC showing hemoglobin of 16.1.  Lipid panel done in 2023 showed an LDL cholesterol 52 and triglycerides of 82 while on rosuvastatin 20 mg daily. ECG done today showed sinus rhythm with a heart rate of 92 bpm.  The patient reports that he has been feeling generally well from the cardiac standpoint. He denies any new chest pain, shortness of breath, palpitations and lightheadedness. He reports that his systolic blood pressure at home is usually in the 130s range. He states that he smokes 4-5 cigarettes per day. He states that he takes all of his medications as prescribed. During my exam, he was resting comfortably on the exam table.            Assessment/Plan:   1. Coronary artery calcification  The patient has a history of coronary artery calcification with a total score of 698.94 predominantly in the LAD territory.  ECG done today showed sinus rhythm with a heart rate of 92 bpm.    He denies any anginal chest discomfort.  Blood pressure appears moderately controlled on exam  today.  He reports that his systolic blood pressure at home is usually in the 130s range.   He should continue current antihypertensive medications.  Nuclear stress done 2/4/2022 showed a small fixed perfusion defect in the inferolateral wall which resolves on prone imaging suggesting diaphragmatic attenuation, low probability of ischemia, and a calculated ejection fraction 71%.   Recent lab works as noted in the HPI.   Lipid panel done in December 2023 showed an LDL cholesterol 52 and triglycerides of 82 while on rosuvastatin 20 mg daily.   Please see lifestyle recommendations below.  Follow up in 1 year and sooner if necessary.      2. Hypertension  Patient has a history of hypertension which appears moderately controlled on exam today.  He reports that his systolic blood pressure at home is usually in the 130s range.   He should continue his current antihypertensive medications and monitor his blood pressure at home.      3. Dyslipidemia  Lipid panel done in December 2023 showed an LDL cholesterol 52 and triglycerides of 82 while on rosuvastatin 20 mg daily.   Please see lifestyle recommendations below.     4. Tobacco Use  The patient reports that he smokes 4-5 cigarettes per day.   I discussed with him the importance of smoking cessation as well as several strategies to assist him in quitting smoking.       Order:   Follow-up in 1 year.    Lifestyle Recommendations  I recommend a whole-food plant-based diet, an eating pattern that encourages the consumption of unrefined plant foods (such as fruits, vegetables, tubers, whole grains, legumes, nuts and seeds) and discourages meats, dairy products, eggs and processed foods.     The AHA/ACC recommends that the patient consume a dietary pattern that emphasizes intake of vegetables, fruits, and whole grains; includes low-fat dairy products, poultry, fish, legumes, non-tropical vegetable oils, and nuts; and limits intake of sodium, sweets, sugar-sweetened beverages, and  red meats.  Adapt this dietary pattern to appropriate calorie requirements (a 500-750 kcal/day deficit to loose weight), personal and cultural food preferences, and nutrition therapy for other medical conditions (including diabetes).  Achieve this pattern by following plans such as the [place] Mediterranean, DASH dietary pattern, or AHA diet.     Engage in 2 hours and 30 minutes per week of moderate-intensity physical activity, or 1 hour and 15 minutes (75 minutes) per week of vigorous-intensity aerobic physical activity, or an equivalent combination of moderate and vigorous-intensity aerobic physical activity. Aerobic activity should be performed in episodes of at least 10 minutes preferably spread throughout the week.     Adhering to a heart healthy diet, regular exercise habits, avoidance of tobacco products, and maintenance of a healthy weight are crucial components of their heart disease risk reduction.     Any positive review of systems not specifically addressed in the office visit today should be evaluated and treated by the patients primary care physician or in an emergency department if necessary     Patient was notified that results from ordered tests will be called to the patient if it changes current management; it will otherwise be discussed at a future appointment and available on  [employer].     Thank you for allowing me to participate in the care of this patient.        This document was generated using the assistance of voice recognition software. If there are any errors of spelling, grammar, syntax, or meaning; please feel free to contact me directly for clarification.    Past Medical History:  He has a past medical history of Colostomy status (CMS/[facility]) (01/06/2021), Incisional hernia without obstruction or gangrene, Incisional hernia without obstruction or gangrene (01/06/2021), Other conditions influencing health status, and Personal history of other diseases of urinary system  "(08/15/2018).    Past Surgical History:  He has a past surgical history that includes Tonsillectomy (05/04/2017); Other surgical history (01/19/2021); Colostomy (04/25/2018); and Other surgical history (04/25/2018).      Social History:  He reports that he has been smoking cigarettes. He has a 7.50 pack-year smoking history. He has never used smokeless tobacco. He reports that he does not currently use alcohol. He reports that he does not currently use drugs.    Family History:  Family History   Problem Relation Name Age of Onset    Heart disease Mother          CAD    Alzheimer's disease Mother      Hypertension Mother      Esophageal cancer Father      Breast cancer Sister      Lupus Sister          Allergies:  Spironolactone    Outpatient Medications:  Current Outpatient Medications   Medication Instructions    acetaminophen (Tylenol) 500 mg tablet 2 tablets, oral, Every 8 hours    amLODIPine (NORVASC) 10 mg, oral, Daily    cholecalciferol (Vitamin D-3) 25 MCG (1000 UT) tablet 1 tablet, oral, Daily    eplerenone (INSPRA) 25 mg, oral, Daily    lisinopriL-hydrochlorothiazide 20-25 mg tablet 1 tablet, oral, Daily    meclizine (ANTIVERT) 12.5 mg, oral, 3 times daily PRN    metoprolol succinate XL (TOPROL-XL) 100 mg, oral, Daily    rosuvastatin (CRESTOR) 20 mg, oral, Daily        ROS:  A 14 point review of systems was done and is negative other than as stated in HPI    Vitals:      3/2/2023     2:37 PM 7/25/2023    11:38 AM 9/6/2023    11:29 AM 10/16/2023     2:23 PM 11/20/2023    12:49 PM 12/12/2023     7:54 AM 1/3/2024     1:25 PM   Vitals   Systolic 130 132    138    Diastolic 80 78    80    Heart Rate 97 90    94    Temp  37 °C (98.6 °F)        Resp  18    16    Height (in) 1.727 m (5' 8\") 1.753 m (5' 9\") 1.727 m (5' 8\") 1.753 m (5' 9\") 1.753 m (5' 9\") 1.753 m (5' 9\") 1.753 m (5' 9\")   Weight (lb) 193 194.4 190 190 190 194 194   BMI 29.35 kg/m2 28.71 kg/m2 28.89 kg/m2 28.06 kg/m2 28.06 kg/m2 28.65 kg/m2 28.65 " kg/m2   BSA (m2) 2.05 m2 2.07 m2 2.03 m2 2.05 m2 2.05 m2 2.07 m2 2.07 m2        Physical Exam:   Constitutional: Cooperative, in no acute distress, alert, appears stated age.  Skin: Skin color, texture, turgor normal. No rashes or lesions.  Head: Normocephalic. No masses, lesions, tenderness or abnormalities  Eyes: Extraocular movements are grossly intact.  Mouth and throat: Mucous membranes moist  Neck: Neck supple, no carotid bruits, no JVD  Respiratory: Lungs clear to auscultation, no wheezing or rhonchi, no use of accessory muscles  Chest wall: No scars, normal excursion with respiration  Cardiovascular: Regular rhythm without murmur, gallop, or rubs  Gastrointestinal: Abdomen soft, nontender. Bowel sounds normal.  Musculoskeletal: Strength equal in upper extremities  Extremities: Trace pitting edema  Neurologic: Sensation grossly intact, alert and oriented x3    Intake/Output:   No intake/output data recorded.    Outpatient Medications  Current Outpatient Medications on File Prior to Visit   Medication Sig Dispense Refill    acetaminophen (Tylenol) 500 mg tablet Take 2 tablets (1,000 mg) by mouth every 8 hours.      amLODIPine (Norvasc) 10 mg tablet Take 1 tablet (10 mg) by mouth once daily. 30 tablet 11    cholecalciferol (Vitamin D-3) 25 MCG (1000 UT) tablet Take 1 tablet (25 mcg) by mouth once daily.      eplerenone (Inspra) 25 mg tablet Take 1 tablet (25 mg) by mouth once daily. 30 tablet 0    lisinopriL-hydrochlorothiazide 20-25 mg tablet Take 1 tablet by mouth once daily. 30 tablet 11    meclizine (Antivert) 12.5 mg tablet Take 1 tablet (12.5 mg) by mouth 3 times a day as needed for dizziness. 30 tablet 5    metoprolol succinate XL (Toprol-XL) 100 mg 24 hr tablet Take 1 tablet (100 mg) by mouth once daily. 30 tablet 11    rosuvastatin (Crestor) 20 mg tablet Take 1 tablet (20 mg) by mouth once daily. 30 tablet 11    [DISCONTINUED] eplerenone (Inspra) 25 mg tablet Take 1 tablet (25 mg) by mouth once daily.  30 tablet 11     No current facility-administered medications on file prior to visit.       Labs: (past 26 weeks)  Recent Results (from the past 4368 hour(s))   Comprehensive Metabolic Panel    Collection Time: 09/22/23  9:27 AM   Result Value Ref Range    Calcium 10.1 8.5 - 10.4 MG/DL    AST 32 5 - 40 U/L    Alkaline Phosphatase 91 35 - 125 U/L    Total Bilirubin 0.5 0.1 - 1.2 MG/DL    Total Protein 8.2 (H) 5.9 - 7.9 G/DL    Albumin 4.8 3.5 - 5.0 GM/DL    Globulin, Total 3.4 1.9 - 3.7 G/DL    A/G Ratio 1.4 (L) 1.5 - 3.0 RATIO    Sodium 138 133 - 145 MMOL/L    Potassium 4.1 3.4 - 5.1 MMOL/L    Chloride 100 97 - 107 MMOL/L    Bicarbonate 26 24 - 31 MMOL/L    Anion Gap 12 0 - 19 MMOL/L    Urea Nitrogen 11 8 - 25 MG/DL    Creatinine 0.8 0.4 - 1.6 MG/DL    Urea Nitrogen/Creatinine (g/g) Urine 13.8 8 - 21 RATIO    Glucose 133 (H) 65 - 99 MG/DL    ALT (SGPT) 42 (H) 5 - 40 U/L    ESTIMATED  mL/min/1.73 m2   CBC and Differential    Collection Time: 09/22/23  9:27 AM   Result Value Ref Range    Differential Type AUTO DIFF     Immature Granulocyte %, Automated 0.40 0.0 - 1.0 %    Neutrophil 72.50 (H) 50 - 70 %    Lymphocytes % 14.50 (L) 20 - 40 %    Monocytes % 10.20 (H) 0 - 8 %    Eosinophil 2.00 0 - 3 %    Basophils % 0.40 0 - 1 %    Immature Granulocytes Absolute, Automated 0.03 0.0 - 0.1 K/UL    Neutrophils Absolute 5.89 1.8 - 7.7 K/UL    Lymphocytes Absolute 1.18 (L) 1.2 - 3.2 K/UL    Monocytes Absolute 0.83 (H) 0 - 0.8 K/UL    Eosinophils Absolute 0.16 0 - 0.45 K/UL    Basophils Absolute 0.03 0.00 - 0.22 K/UL    WBC 8.1 4.5 - 11.0 K/UL    RBC 4.88 4.5 - 5.5 M/UL    Hemoglobin 15.0 13.5 - 16.5 GM/DL    Hematocrit 43.7 41 - 50 %    MCV 89.5 80 - 100 FL    MCH 30.7 26 - 34 PG    MCHC 34.3 31 - 37 %    RDW-SD 39.4 37.0 - 54.0 FL    RDW-CV 12.0 11.7 - 15.0 %    Platelets 289 150 - 450 K/UL    MPV 8.3 7.0 - 12.6 CU    ABSOLUTE NEUTROPHIL CALCULATED 5.89 K/UL   Coagulation Screen    Collection Time: 09/22/23  9:27 AM    Result Value Ref Range    Anticoagulant NONE     Protime 9.2 (L) 9.3 - 12.7 SEC    INR 0.9 0.86 - 1.16    aPTT 24.1 22.0 - 32.5 SEC   Urinalysis with Reflex Microscopic and Culture    Collection Time: 09/22/23  9:42 AM   Result Value Ref Range    Microscopic MANUAL MICROSCOPIC URINES     WBC, Urine NONE SEEN 0 - 3 /HPF    RBC, Urine NONE SEEN 0 - 3 /HPF    Color, Urine YELLOW     Appearance, Urine CLEAR     Specific Gravity, Urine 1.010 1.005 - 1.030    pH, Urine 6.5 4.6 - 8.0    Leukocyte Esterase, Urine NEGATIVE NEG    Nitrite, Urine NEGATIVE NEG    Protein, Urine NEGATIVE NEG mg/dL    Glucose, Urine 100 (A) NEG mg/dL    Ketones, Urine NEGATIVE NEG    Urobilinogen, Urine 0.2 0 - 1.0 MG/DL    Bilirubin, Urine NEGATIVE NEG    Blood, Urine NEGATIVE NEG    Urine Culture       CULTURE NOT INDICATED  Performed at [facility] [place] [id number] [place] [address]     Prostate Specific Antigen    Collection Time: 12/07/23  7:33 AM   Result Value Ref Range    Prostate Specific AG 0.50 <=4.00 ng/mL   CBC and Auto Differential    Collection Time: 12/07/23  7:33 AM   Result Value Ref Range    WBC 8.8 4.4 - 11.3 x10*3/uL    nRBC 0.0 0.0 - 0.0 /100 WBCs    RBC 5.16 4.50 - 5.90 x10*6/uL    Hemoglobin 16.1 13.5 - 17.5 g/dL    Hematocrit 47.0 41.0 - 52.0 %    MCV 91 80 - 100 fL    MCH 31.2 26.0 - 34.0 pg    MCHC 34.3 32.0 - 36.0 g/dL    RDW 12.3 11.5 - 14.5 %    Platelets 314 150 - 450 x10*3/uL    Neutrophils % 63.7 40.0 - 80.0 %    Immature Granulocytes %, Automated 0.6 0.0 - 0.9 %    Lymphocytes % 22.2 13.0 - 44.0 %    Monocytes % 9.7 2.0 - 10.0 %    Eosinophils % 3.3 0.0 - 6.0 %    Basophils % 0.5 0.0 - 2.0 %    Neutrophils Absolute 5.62 1.20 - 7.70 x10*3/uL    Immature Granulocytes Absolute, Automated 0.05 0.00 - 0.70 x10*3/uL    Lymphocytes Absolute 1.95 1.20 - 4.80 x10*3/uL    Monocytes Absolute 0.85 0.10 - 1.00 x10*3/uL    Eosinophils Absolute 0.29 0.00 - 0.70 x10*3/uL    Basophils Absolute 0.04 0.00 - 0.10 x10*3/uL    Comprehensive Metabolic Panel    Collection Time: 12/07/23  7:33 AM   Result Value Ref Range    Glucose 89 74 - 99 mg/dL    Sodium 136 136 - 145 mmol/L    Potassium 4.1 3.5 - 5.3 mmol/L    Chloride 99 98 - 107 mmol/L    Bicarbonate 26 21 - 32 mmol/L    Anion Gap 15 10 - 20 mmol/L    Urea Nitrogen 8 6 - 23 mg/dL    Creatinine 0.65 0.50 - 1.30 mg/dL    eGFR >90 >60 mL/min/1.73m*2    Calcium 9.5 8.6 - 10.3 mg/dL    Albumin 4.7 3.4 - 5.0 g/dL    Alkaline Phosphatase 84 33 - 120 U/L    Total Protein 7.8 6.4 - 8.2 g/dL    AST 40 (H) 9 - 39 U/L    Bilirubin, Total 0.6 0.0 - 1.2 mg/dL    ALT 49 10 - 52 U/L   Lipid Panel    Collection Time: 12/07/23  7:33 AM   Result Value Ref Range    Cholesterol 151 0 - 199 mg/dL    HDL-Cholesterol 82.9 mg/dL    Cholesterol/HDL Ratio 1.8     LDL Calculated 52 <=99 mg/dL    VLDL 16 0 - 40 mg/dL    Triglycerides 82 0 - 149 mg/dL    Non HDL Cholesterol 68 0 - 149 mg/dL   TSH with reflex to Free T4 if abnormal    Collection Time: 12/07/23  7:33 AM   Result Value Ref Range    Thyroid Stimulating Hormone 1.65 0.44 - 3.98 mIU/L       ECG  No results found for this or any previous visit (from the past 4464 hour(s)).    Echocardiogram  No results found for this or any previous visit from the past 1095 days.      CV Studies:  EKG: No results found for this or any previous visit (from the past 4464 hour(s)).  Echocardiogram: No results found for this or any previous visit from the past 1825 days.    Stress Testing IMESULT(UJO4494:1:1825):   NM CARDIAC STRESS REST (MYOCARDIAL PERFUSION MIBI) 02/04/2022    Narrative  MRN: [id number]  Patient Name: MATTHEW JANSEN    STUDY:  CARDIAC STRESS/REST INJECTION; PART 2 STRESS OR REST (NO CHARGE);  CARDIAC STRESS/REST (MYOCARDIAL PERFUSION/MIBI);  2/4/2022 11:19 am    INDICATION:  Coronary artery calcification  I10: Essential hypertension, benign  I25.10: Coronary artery calcification E78.5: Dyslipidemia.    COMPARISON:  None.    ACCESSION  NUMBER(S):  [id number]; [id number]; [id number]    ORDERING CLINICIAN:  BETTY RODRIGEZ    TECHNIQUE:  DIVISION OF NUCLEAR MEDICINE  STRESS MYOCARDIAL PERFUSION SCAN, ONE DAY PROTOCOL    The patient received an intravenous dose of  10.4 mCi of Tc-99m  tetrofosmin and resting emission tomographic (SPECT) images of the  myocardium were acquired. The patient then exercised via treadmill  stress to  103 % of MPHR and achieved  7.0 METS. At peak stress  35.8  mCi of Tc-99m tetrofosmin were administered and stress phase SPECT  images of the myocardium were then acquired. These included ECG-gated  images to assess and quantify ventricular function.    FINDINGS:    There is a small fixed perfusion defect in the inferolateral wall  which resolves on prone imaging suggesting diaphragmatic attenuation.  No reversible perfusion defects seen.    Calculated ejection fraction of 71% without segmental wall motion  abnormalities seen.    Impression  1. There is a small fixed perfusion defect in the inferolateral wall  which resolves on prone imaging suggesting diaphragmatic attenuation.  There is a low probability of ischemia.    2. Calculated ejection fraction of 71% without segmental wall motion  abnormalities seen.    Cardiac Catheterization: No results found for this or any previous visit from the past 1825 days.  No results found for this or any previous visit from the past 3650 days.     Cardiac Scoring:   CT HEART CALCIUM SCORING WO IV CONTRAST 01/04/2022    Narrative  MRN: [id number]  Patient Name: MATTHEW JANSEN    STUDY:  CT CARDIAC SCORING;  1/4/2022 7:46 am    INDICATION:  cardiac screening  Z13.6: Screening for cardiovascular condition.    COMPARISON:  None.    ACCESSION NUMBER(S):  [id number]    ORDERING CLINICIAN:  EDUARDO LOPEZ    TECHNIQUE:  Using prospective ECG gating, CT scan of the coronary arteries was  performed without intravenous contrast. Coronary calcium scoring  was  performed according to the method of  "Jessy.    FINDINGS:  The score and distribution of calcium in the coronary arteries is as  follows:    LM: 0.  LAD: 655.11.  LCx: 36.24.  RCA: 7.59.    Total: 698.94.    The visualized segments of the lungs demonstrate mild areas of  atelectasis and/or scarring, predominantly in the lower lobes.    The visualized mid/lower ascending thoracic aorta measures 3.7 cm in  diameter.  This is mildly ectatic.    The heart is normal in size. No pericardial effusion is present.    No gross evidence of mediastinal or hilar lymphadenopathy is  identified.    The visualized subdiaphragmatic structures appear grossly intact.    Impression  1. Coronary artery calcium score of 698.94*.    *Coronary artery calcium scoring may be helpful in predicting the  risk for future coronary heart disease events.  According to the  American College of Cardiology Foundation Clinical Expert Consensus  Task Force, such testing provides important prognostic information in  patients with more than one coronary heart disease risk factor. The  coronary artery calcium score correlates with the annual risk of a  non-fatal myocardial infarction or coronary heart disease death.    Coronary artery score            Annual Risk    0-99                             0.4%  100-399                        1.3%  >400                            2.4%    These three \"breakpoints\" correspond to lower, intermediate and high  risk states for future coronary events.  Such information should be  used, along with appropriate clinical judgment, to make decisions  regarding the intensity of risk factor management strategies to treat  blood lipids and to modify other non-lipid coronary risk factors.    Reference: [place] P et al. Circulation.  2007; 115:402-426    AAA : No results found for this or any previous visit from the past 1825 days.    OTHER: No results found for this or any previous visit from the past 1825 days.    LAST IMAGING RESULTS  MR shoulder  Narrative: " Interpreted By:  FANNY DE MD  MRN: [id number]  Patient Name: MATTHEW JANSEN     STUDY:  MRI of the  Left shoulder  without intravenous contrast date  8/29/2023.     INDICATION:  pain  M75.102: Tear of left rotator cuff, unspecified tear extent,  unspecified whether traumatic     COMPARISON:  None.     ACCESSION NUMBER(S):  [id number]     ORDERING CLINICIAN:  LAWSON LOZADA     TECHNIQUE:  Multiplanar multisequence MRI of the  Left shoulder was performed  without intra-articular or intravenous contrast.     FINDINGS:  MUSCLES AND TENDONS:     There is complete retracted tear of the supraspinatus and  infraspinatus tendon with retraction back to the level of the  undersurface the acromioclavicular joint. The teres minor tendon is  intact.  There is thickening with increased intermediate signal  intensity within the distal to insertional subscapularis tendon.  There is possibly some medial subluxation of the biceps tendon out of  the bicipital groove into the subscapularis tendon although this is  not well assessed for due to significant internal rotation of the  shoulder. Is a small volume of fluid in the biceps tendon sheath.  There is thickening of the tendon the long head of the biceps.  There is moderate supraspinatus muscle atrophy. There is mild atrophy  of the upper subscapularis and infraspinatus muscle.  No mass is  evident and the suprascapular or spinoglenoid notch or the  quadrangular space.     OSSEOUS STRUCTURES AND JOINTS:     No displaced glenoid labral tear is evident. There is  degenerative  tearing is seen of the glenoid labrum. Degenerative change of the  glenohumeral joint. There is  mild degenerative change of the  acromioclavicular joint.   The acromion is  type II  without  significant lateral downsloping.   There is a small volume shoulder  joint effusion.     No fracture or dislocation is evident. Cystic changes are seen at the  greater tubercle of the humerus. Bone marrow signal intensity  is  otherwise within normal limits.     SOFT TISSUES:     There is a small volume of fluid in the subacromial subdeltoid bursa.  There is no significant volume of fluid in the subcoracoid bursa.  Associated soft tissues of the shoulder are grossly unremarkable.     Impression: 1. Complete retracted tear of the supraspinatus and infraspinatus  tendons.  2. Possible partial-thickness articular to interstitial tear at the  superior most subscapularis tendon with medial biceps tendon  subluxation into the tear. There is mild tendon the long head of the  biceps tendinosis, moderate subscapularis tendinosis and mild  tenosynovitis of the biceps tendon sheath.  3. Degenerative change of the shoulder with degenerative tearing of  the glenoid labrum.  4. Rotator cuff muscle atrophy.  5. Small volume glenohumeral joint effusion and mild subacromial  subdeltoid bursitis.    Problem List Items Addressed This Visit       Coronary artery calcification - Primary    Dyslipidemia    Primary hypertension    Tobacco use          Ross Raines DO, FACC, FACOI

## 2024-03-15 ENCOUNTER — OFFICE VISIT (OUTPATIENT)
Dept: CARDIOLOGY | Facility: CLINIC | Age: 59
End: 2024-03-15
Payer: COMMERCIAL

## 2024-03-15 VITALS
DIASTOLIC BLOOD PRESSURE: 78 MMHG | HEIGHT: 69 IN | SYSTOLIC BLOOD PRESSURE: 148 MMHG | HEART RATE: 92 BPM | BODY MASS INDEX: 28.14 KG/M2 | WEIGHT: 190 LBS

## 2024-03-15 DIAGNOSIS — I10 PRIMARY HYPERTENSION: ICD-10-CM

## 2024-03-15 DIAGNOSIS — E78.5 DYSLIPIDEMIA: ICD-10-CM

## 2024-03-15 DIAGNOSIS — I25.84 CORONARY ARTERY CALCIFICATION: Primary | ICD-10-CM

## 2024-03-15 DIAGNOSIS — Z72.0 TOBACCO USE: ICD-10-CM

## 2024-03-15 DIAGNOSIS — I25.10 CORONARY ARTERY CALCIFICATION: Primary | ICD-10-CM

## 2024-03-15 DIAGNOSIS — I25.10 CORONARY ARTERY CALCIFICATION: ICD-10-CM

## 2024-03-15 DIAGNOSIS — I25.84 CORONARY ARTERY CALCIFICATION: ICD-10-CM

## 2024-03-15 PROCEDURE — 93000 ELECTROCARDIOGRAM COMPLETE: CPT | Performed by: INTERNAL MEDICINE

## 2024-03-15 PROCEDURE — 99213 OFFICE O/P EST LOW 20 MIN: CPT | Performed by: INTERNAL MEDICINE

## 2024-03-15 PROCEDURE — 3077F SYST BP >= 140 MM HG: CPT | Performed by: INTERNAL MEDICINE

## 2024-03-15 PROCEDURE — 3078F DIAST BP <80 MM HG: CPT | Performed by: INTERNAL MEDICINE

## 2024-03-18 RX ORDER — EPLERENONE 25 MG/1
25 TABLET, FILM COATED ORAL DAILY
Qty: 90 TABLET | Refills: 1 | Status: SHIPPED | OUTPATIENT
Start: 2024-03-18

## 2024-03-20 ENCOUNTER — OFFICE VISIT (OUTPATIENT)
Dept: ORTHOPEDIC SURGERY | Facility: CLINIC | Age: 59
End: 2024-03-20
Payer: COMMERCIAL

## 2024-03-20 DIAGNOSIS — S46.012D TRAUMATIC COMPLETE TEAR OF LEFT ROTATOR CUFF, SUBSEQUENT ENCOUNTER: Primary | ICD-10-CM

## 2024-03-20 PROCEDURE — 99213 OFFICE O/P EST LOW 20 MIN: CPT | Performed by: STUDENT IN AN ORGANIZED HEALTH CARE EDUCATION/TRAINING PROGRAM

## 2024-03-20 NOTE — LETTER
March 20, 2024     Patient: Kalyan Ramon   YOB: 1965   Date of Visit: 3/20/2024       To Whom it May Concern:    Kalyan Ramon was seen in my clinic on 3/20/2024. He may begin to return to work full duty with no restrictions as tolerated. Any questions or concerns please call us at [phone]          Sincerely,          Pete Ramirez MD        CC: No Recipients

## 2024-03-20 NOTE — PROGRESS NOTES
PRIMARY CARE PHYSICIAN: Indy Del Cid MD    ORTHOPAEDIC POSTOPERATIVE VISIT    ASSESSMENT & PLAN    Impression: 58 y.o. male 6 months postop s/p left shoulder arthroscopy, rotator cuff repair, biceps tenodesis and subacromial decompression 9/29/23.    Plan:   Overall he is doing well.  He will continue working on his rotator cuff strength and scapular stabilization exercises on his own.  He will work on his shoulder range of motion.  He understands the plan going forward.  He is cleared to progress to return to his normal activities at work without limitations.     I reviewed their postoperative timeline and plan with them. They understand the postoperative precautions and the treatment plan going forward.     Follow-Up: Patient will follow-up as needed    At the end of the visit, all questions were answered in full. The patient is in agreement with the plan and recommendations. They will call the office with any questions/concerns.      Note dictated with [employer] software. Completed without full typed error editing and sent to avoid delay.      SUBJECTIVE  CHIEF COMPLAINT: Postop    HPI: Kalyan Rmaon is a 58 y.o. patient. Kalyan Ramon is now 6 months postop status post left shoulder arthroscopy, rotator cuff repair, biceps tenodesis and subacromial decompression 9/29/23 Overall he is doing well.  He states that he still feels weak, but denies any pain.    REVIEW OF SYSTEMS  Constitutional: See HPI for pain assessment, No significant weight loss, recent trauma  Cardiovascular: No chest pain, shortness of breath  Respiratory: No difficulty breathing, cough  Gastrointestinal: No nausea, vomiting, diarrhea, constipation  Musculoskeletal: Noted in HPI, positive for pain, restricted motion, stiffness  Integumentary: No rashes, easy bruising, redness   Neurological: no numbness or tingling in extremities, no gait disturbances   Psychiatric: No mood changes, memory changes, social  "issues  Heme/Lymph: no excessive swelling, easy bruising, excessive bleeding  ENT: No hearing changes  Eyes: No vision changes    CURRENT MEDICATIONS:   Current Outpatient Medications   Medication Sig Dispense Refill    acetaminophen (Tylenol) 500 mg tablet Take 2 tablets (1,000 mg) by mouth every 8 hours.      amLODIPine (Norvasc) 10 mg tablet Take 1 tablet (10 mg) by mouth once daily. 30 tablet 11    cholecalciferol (Vitamin D-3) 25 MCG (1000 UT) tablet Take 1 tablet (25 mcg) by mouth once daily.      eplerenone (Inspra) 25 mg tablet Take 1 tablet (25 mg) by mouth once daily. 90 tablet 1    lisinopriL-hydrochlorothiazide 20-25 mg tablet Take 1 tablet by mouth once daily. 30 tablet 11    meclizine (Antivert) 12.5 mg tablet Take 1 tablet (12.5 mg) by mouth 3 times a day as needed for dizziness. 30 tablet 5    metoprolol succinate XL (Toprol-XL) 100 mg 24 hr tablet Take 1 tablet (100 mg) by mouth once daily. 30 tablet 11    rosuvastatin (Crestor) 20 mg tablet Take 1 tablet (20 mg) by mouth once daily. 30 tablet 11     No current facility-administered medications for this visit.        OBJECTIVE    PHYSICAL EXAM      7/25/2023    11:38 AM 9/6/2023    11:29 AM 10/16/2023     2:23 PM 11/20/2023    12:49 PM 12/12/2023     7:54 AM 1/3/2024     1:25 PM 3/15/2024     3:18 PM   Vitals   Systolic 132    138  148   Diastolic 78    80  78   Heart Rate 90    94  92   Temp 37 °C (98.6 °F)         Resp 18    16     Height (in) 1.753 m (5' 9\") 1.727 m (5' 8\") 1.753 m (5' 9\") 1.753 m (5' 9\") 1.753 m (5' 9\") 1.753 m (5' 9\") 1.753 m (5' 9\")   Weight (lb) 194.4 190 190 190 194 194 190   BMI 28.71 kg/m2 28.89 kg/m2 28.06 kg/m2 28.06 kg/m2 28.65 kg/m2 28.65 kg/m2 28.06 kg/m2   BSA (m2) 2.07 m2 2.03 m2 2.05 m2 2.05 m2 2.07 m2 2.07 m2 2.05 m2   Visit Report Report  Report Report Report Report Report      There is no height or weight on file to calculate BMI.    General: Well-appearing, no acute distress    Skin intact bilateral upper and " lower extremities  No erythema  No warmth    Detailed examination of the left shoulder demonstrates:  Surgical incision(s) well-healed  No erythema or warmth  No ecchymosis or soft tissue swelling  Biceps contour normal  Shoulder range of motion: Forward flexion to 150, ER 20  Good resistance with Jobes, ER and belly press testing  Upper extremity motor grossly intact  C5-T1 sensation intact bilaterally  2+ radial pulses bilaterally  Warm and well-perfused, brisk capillary refill    IMAGING:   No new imaging      Pete Ramirez MD  Attending Surgeon    Sports Medicine Orthopaedic Surgery  [facility] [place]  [facility] [employer]

## 2024-08-23 ENCOUNTER — ANESTHESIA (OUTPATIENT)
Dept: GASTROENTEROLOGY | Facility: HOSPITAL | Age: 59
End: 2024-08-23
Payer: COMMERCIAL

## 2024-08-23 ENCOUNTER — HOSPITAL ENCOUNTER (OUTPATIENT)
Dept: GASTROENTEROLOGY | Facility: HOSPITAL | Age: 59
Discharge: HOME | End: 2024-08-23
Payer: COMMERCIAL

## 2024-08-23 ENCOUNTER — ANESTHESIA EVENT (OUTPATIENT)
Dept: GASTROENTEROLOGY | Facility: HOSPITAL | Age: 59
End: 2024-08-23
Payer: COMMERCIAL

## 2024-08-23 VITALS
HEART RATE: 82 BPM | DIASTOLIC BLOOD PRESSURE: 84 MMHG | TEMPERATURE: 97.8 F | WEIGHT: 190 LBS | OXYGEN SATURATION: 98 % | SYSTOLIC BLOOD PRESSURE: 153 MMHG | HEIGHT: 69 IN | BODY MASS INDEX: 28.14 KG/M2 | RESPIRATION RATE: 18 BRPM

## 2024-08-23 DIAGNOSIS — Z12.11 SCREEN FOR COLON CANCER: ICD-10-CM

## 2024-08-23 PROCEDURE — 3700000001 HC GENERAL ANESTHESIA TIME - INITIAL BASE CHARGE

## 2024-08-23 PROCEDURE — 2500000004 HC RX 250 GENERAL PHARMACY W/ HCPCS (ALT 636 FOR OP/ED): Performed by: SURGERY

## 2024-08-23 PROCEDURE — 7100000010 HC PHASE TWO TIME - EACH INCREMENTAL 1 MINUTE

## 2024-08-23 PROCEDURE — 45385 COLONOSCOPY W/LESION REMOVAL: CPT | Performed by: SURGERY

## 2024-08-23 PROCEDURE — 2500000004 HC RX 250 GENERAL PHARMACY W/ HCPCS (ALT 636 FOR OP/ED): Performed by: NURSE ANESTHETIST, CERTIFIED REGISTERED

## 2024-08-23 PROCEDURE — 2500000005 HC RX 250 GENERAL PHARMACY W/O HCPCS: Performed by: NURSE ANESTHETIST, CERTIFIED REGISTERED

## 2024-08-23 PROCEDURE — 7100000009 HC PHASE TWO TIME - INITIAL BASE CHARGE

## 2024-08-23 PROCEDURE — 3700000002 HC GENERAL ANESTHESIA TIME - EACH INCREMENTAL 1 MINUTE

## 2024-08-23 RX ORDER — LIDOCAINE HCL/PF 100 MG/5ML
SYRINGE (ML) INTRAVENOUS AS NEEDED
Status: DISCONTINUED | OUTPATIENT
Start: 2024-08-23 | End: 2024-08-23

## 2024-08-23 RX ORDER — PROPOFOL 10 MG/ML
INJECTION, EMULSION INTRAVENOUS AS NEEDED
Status: DISCONTINUED | OUTPATIENT
Start: 2024-08-23 | End: 2024-08-23

## 2024-08-23 RX ORDER — SODIUM CHLORIDE, SODIUM LACTATE, POTASSIUM CHLORIDE, CALCIUM CHLORIDE 600; 310; 30; 20 MG/100ML; MG/100ML; MG/100ML; MG/100ML
20 INJECTION, SOLUTION INTRAVENOUS CONTINUOUS
Status: DISCONTINUED | OUTPATIENT
Start: 2024-08-23 | End: 2024-08-24 | Stop reason: HOSPADM

## 2024-08-23 SDOH — HEALTH STABILITY: MENTAL HEALTH: CURRENT SMOKER: 1

## 2024-08-23 ASSESSMENT — PAIN DESCRIPTION - DESCRIPTORS: DESCRIPTORS: DISCOMFORT

## 2024-08-23 ASSESSMENT — COLUMBIA-SUICIDE SEVERITY RATING SCALE - C-SSRS
6. HAVE YOU EVER DONE ANYTHING, STARTED TO DO ANYTHING, OR PREPARED TO DO ANYTHING TO END YOUR LIFE?: NO
2. HAVE YOU ACTUALLY HAD ANY THOUGHTS OF KILLING YOURSELF?: NO
1. IN THE PAST MONTH, HAVE YOU WISHED YOU WERE DEAD OR WISHED YOU COULD GO TO SLEEP AND NOT WAKE UP?: NO

## 2024-08-23 ASSESSMENT — PAIN - FUNCTIONAL ASSESSMENT
PAIN_FUNCTIONAL_ASSESSMENT: 0-10
PAIN_FUNCTIONAL_ASSESSMENT: 0-10

## 2024-08-23 ASSESSMENT — PAIN SCALES - GENERAL
PAINLEVEL_OUTOF10: 3
PAINLEVEL_OUTOF10: 0 - NO PAIN

## 2024-08-23 NOTE — ANESTHESIA POSTPROCEDURE EVALUATION
Patient: Kalyan Ramon    Procedure Summary       Date: 08/23/24 Room / Location: [facility]    Anesthesia Start: 0855 Anesthesia Stop: 0950    Procedure: COLONOSCOPY Diagnosis: Screen for colon cancer    Scheduled Providers: Sammi Cid MD Responsible Provider: DINA Tripathi    Anesthesia Type: MAC ASA Status: 3            Anesthesia Type: MAC    Vitals Value Taken Time   /83 08/23/24 0946   Temp 36.9 °C (98.4 °F) 08/23/24 0946   Pulse 85 08/23/24 0946   Resp 18 08/23/24 0946   SpO2 98 % 08/23/24 0946       Anesthesia Post Evaluation    Patient location during evaluation: bedside  Patient participation: complete - patient participated  Level of consciousness: awake  Pain management: adequate  Airway patency: patent  Cardiovascular status: acceptable  Respiratory status: acceptable  Hydration status: acceptable  Postoperative Nausea and Vomiting: none    There were no known notable events for this encounter.

## 2024-08-23 NOTE — DISCHARGE INSTRUCTIONS
Patient Instructions after a Colonoscopy      The anesthetics, sedatives or narcotics which were given to you today will be acting in your body for the next 24 hours, so you might feel a little sleepy or groggy.  This feeling should slowly wear off. Carefully read and follow the instructions.     You received sedation today:  - Do not drive or operate any machinery or power tools of any kind.   - No alcoholic beverages today, not even beer or wine.  - Do not make any important decisions or sign any legal documents.  - No over the counter medications that contain alcohol or that may cause drowsiness.  - Do not make any important decisions or sign any legal documents.  - Make sure you have someone with you for first 24 hours.    While it is common to experience mild to moderate abdominal distention, gas, or belching after your procedure, if any of these symptoms occur following discharge from the GI Lab or within one week of having your procedure, call the [facility] to be advised whether a visit to your nearest Urgent Care or Emergency Department is indicated.  Take this paper with you if you go.     - If you develop an allergic reaction to the medications that were given during your procedure such as difficulty breathing, rash, hives, severe nausea, vomiting or lightheadedness.  - If you experience chest pain, shortness of breath, severe abdominal pain, fevers and chills.  -If you develop signs and symptoms of bleeding such as blood in your spit, if your stools turn black, tarry, or bloody  - If you have not urinated within 8 hours following your procedure.  - If your IV site becomes painful, red, inflamed, or looks infected.    If you received a biopsy/polypectomy/sphincterotomy the following instructions apply below:    __ Do not use Aspirin containing products, non-steroidal medications or anti-coagulants for one week following your procedure. (Examples of these types of medications are: Advil,  Arthrotec, Aleve, Coumadin, Ecotrin, Heparin, Ibuprofen, Indocin, Motrin, Naprosyn, Nuprin, Plavix, Vioxx, and Voltarin, or their generic forms.  This list is not all-inclusive.  Check with your physician or pharmacist before resuming medications.)   __ Eat a soft diet today.  Avoid foods that are poorly digested for the next 24 hours.  These foods would include: nuts, beans, lettuce, red meats, and fried foods. Start with liquids and advance your diet as tolerated, gradually work up to eating solids.   __ Do not have a Barium Study or Enema for one week.    Your physician recommends the additional following instructions:    -You have a contact number available for emergencies. The signs and symptoms of potential delayed complications were discussed with you. You may return to normal activities tomorrow.  -Resume your previous diet.  -Continue your present medications.   -We are waiting for your pathology results.  -Your physician has recommended a repeat colonoscopy (date to be determined after pending pathology results are reviewed) for surveillance based on pathology results.  -The findings and recommendations have been discussed with you.  -The findings and recommendations were discussed with your family.  - Please see Medication Reconciliation Form for new medication/medications prescribed.       If you experience any problems or have any questions following discharge from the GI Lab, please call:        Nurse Signature                                                                        Date___________________                                                                            Patient/Responsible Party Signature                                        Date___________________

## 2024-08-23 NOTE — H&P
History Of Present Illness  Kalyan Ramon is a 58 y.o. male presenting for colonoscopy.     Past Medical History  Past Medical History:   Diagnosis Date    Colostomy status (Multi) 01/06/2021    Colostomy in place    Hypertension     Incisional hernia without obstruction or gangrene     Recurrent incisional hernia    Incisional hernia without obstruction or gangrene 01/06/2021    Incisional hernia    Other conditions influencing health status     No significant past medical history    Personal history of other diseases of urinary system 08/15/2018    History of urethral stricture       Surgical History  Past Surgical History:   Procedure Laterality Date    COLOSTOMY  04/25/2018    Colostomy    OTHER SURGICAL HISTORY  01/19/2021    Hernia repair    OTHER SURGICAL HISTORY  04/25/2018    Partial Colectomy With Colostomy    TONSILLECTOMY  05/04/2017    Tonsillectomy        Social History  He reports that he has been smoking cigarettes. He has a 7.5 pack-year smoking history. He has never used smokeless tobacco. He reports that he does not currently use alcohol. He reports that he does not currently use drugs.    Family History  Family History   Problem Relation Name Age of Onset    Heart disease Mother          CAD    Alzheimer's disease Mother      Hypertension Mother      Esophageal cancer Father      Breast cancer Sister      Lupus Sister          Allergies  Spironolactone    Review of Systems     Physical Exam  Eyes:      Extraocular Movements: Extraocular movements intact.      Pupils: Pupils are equal, round, and reactive to light.   Cardiovascular:      Rate and Rhythm: Normal rate and regular rhythm.   Pulmonary:      Effort: Pulmonary effort is normal.   Abdominal:      Palpations: Abdomen is soft.   Skin:     General: Skin is warm and dry.   Neurological:      Mental Status: He is alert.   Psychiatric:         Mood and Affect: Mood normal.         Behavior: Behavior normal.          Last Recorded  "Vitals  Blood pressure 145/79, pulse 81, temperature 36.6 °C (97.8 °F), temperature source Tympanic, resp. rate 16, height 1.753 m (5' 9\"), weight 86.2 kg (190 lb), SpO2 98%.    Relevant Results             Assessment/Plan   Assessment & Plan  Screen for colon cancer      Pt here for colonoscopy       I spent 15 minutes in the professional and overall care of this patient.      Sammi Cid MD    "

## 2024-08-23 NOTE — ANESTHESIA PREPROCEDURE EVALUATION
Patient: Kalyan Ramon    Procedure Information       Date/Time: 08/23/24 0900    Scheduled providers: Sammi Cid MD    Procedure: COLONOSCOPY    [facility]            Relevant Problems   Cardiac   (+) High triglycerides   (+) Mixed hyperlipidemia   (+) Primary hypertension      Endocrine   (+) Hypothyroidism   (+) Obesity      ID   (+) Wound infection       Clinical information reviewed:   Tobacco  Allergies  Meds   Med Hx  Surg Hx   Fam Hx  Soc Hx        NPO Detail:  NPO/Void Status  Date of Last Liquid: 08/23/24  Time of Last Liquid: 0600  Date of Last Solid: 08/22/24         Physical Exam    Airway  Mallampati: III     Cardiovascular - normal exam     Dental    Pulmonary - normal exam     Abdominal      Other findings: partial        Anesthesia Plan    History of general anesthesia?: yes  History of complications of general anesthesia?: no    ASA 3     MAC     The patient is a current smoker.  Patient was previously instructed to abstain from smoking on day of procedure.  Patient did not smoke on day of procedure.    Anesthetic plan and risks discussed with patient.  Use of blood products discussed with who consented to blood products.

## 2024-08-28 LAB
LABORATORY COMMENT REPORT: NORMAL
PATH REPORT.FINAL DX SPEC: NORMAL
PATH REPORT.GROSS SPEC: NORMAL
PATH REPORT.RELEVANT HX SPEC: NORMAL
PATH REPORT.TOTAL CANCER: NORMAL

## 2024-10-01 DIAGNOSIS — I10 PRIMARY HYPERTENSION: ICD-10-CM

## 2024-10-01 DIAGNOSIS — E78.5 DYSLIPIDEMIA: Primary | ICD-10-CM

## 2024-10-01 DIAGNOSIS — I25.10 CORONARY ARTERY CALCIFICATION: ICD-10-CM

## 2024-10-01 RX ORDER — EPLERENONE 25 MG/1
25 TABLET, FILM COATED ORAL DAILY
Qty: 90 TABLET | Refills: 0 | Status: SHIPPED | OUTPATIENT
Start: 2024-10-01

## 2024-10-01 NOTE — TELEPHONE ENCOUNTER
10/1/24  0915  Called patient; no answer. Left voice message for patient to have fasting labs done for medication renewal.

## 2024-12-03 ENCOUNTER — TELEPHONE (OUTPATIENT)
Dept: PRIMARY CARE | Facility: CLINIC | Age: 59
End: 2024-12-03
Payer: COMMERCIAL

## 2024-12-03 DIAGNOSIS — E03.9 ACQUIRED HYPOTHYROIDISM: ICD-10-CM

## 2024-12-03 DIAGNOSIS — Z00.00 ANNUAL PHYSICAL EXAM: ICD-10-CM

## 2024-12-03 DIAGNOSIS — E78.5 DYSLIPIDEMIA: Primary | ICD-10-CM

## 2024-12-03 DIAGNOSIS — I10 PRIMARY HYPERTENSION: ICD-10-CM

## 2024-12-03 DIAGNOSIS — R35.1 NOCTURIA: ICD-10-CM

## 2024-12-06 ENCOUNTER — LAB (OUTPATIENT)
Dept: LAB | Facility: LAB | Age: 59
End: 2024-12-06
Payer: COMMERCIAL

## 2024-12-06 DIAGNOSIS — I10 PRIMARY HYPERTENSION: ICD-10-CM

## 2024-12-06 DIAGNOSIS — R35.1 NOCTURIA: ICD-10-CM

## 2024-12-06 DIAGNOSIS — E03.9 ACQUIRED HYPOTHYROIDISM: ICD-10-CM

## 2024-12-06 DIAGNOSIS — Z00.00 ANNUAL PHYSICAL EXAM: ICD-10-CM

## 2024-12-06 DIAGNOSIS — I25.10 CORONARY ARTERY CALCIFICATION: ICD-10-CM

## 2024-12-06 DIAGNOSIS — E78.5 DYSLIPIDEMIA: ICD-10-CM

## 2024-12-06 LAB
ALBUMIN SERPL BCP-MCNC: 4.5 G/DL (ref 3.4–5)
ALP SERPL-CCNC: 67 U/L (ref 33–120)
ALT SERPL W P-5'-P-CCNC: 36 U/L (ref 10–52)
ANION GAP SERPL CALC-SCNC: 12 MMOL/L (ref 10–20)
AST SERPL W P-5'-P-CCNC: 41 U/L (ref 9–39)
BASOPHILS # BLD AUTO: 0.04 X10*3/UL (ref 0–0.1)
BASOPHILS NFR BLD AUTO: 0.5 %
BILIRUB SERPL-MCNC: 0.7 MG/DL (ref 0–1.2)
BUN SERPL-MCNC: 8 MG/DL (ref 6–23)
CALCIUM SERPL-MCNC: 9.1 MG/DL (ref 8.6–10.3)
CHLORIDE SERPL-SCNC: 100 MMOL/L (ref 98–107)
CHOLEST SERPL-MCNC: 138 MG/DL (ref 0–199)
CHOLESTEROL/HDL RATIO: 2.1
CO2 SERPL-SCNC: 27 MMOL/L (ref 21–32)
CREAT SERPL-MCNC: 0.61 MG/DL (ref 0.5–1.3)
EGFRCR SERPLBLD CKD-EPI 2021: >90 ML/MIN/1.73M*2
EOSINOPHIL # BLD AUTO: 0.22 X10*3/UL (ref 0–0.7)
EOSINOPHIL NFR BLD AUTO: 2.5 %
ERYTHROCYTE [DISTWIDTH] IN BLOOD BY AUTOMATED COUNT: 12.3 % (ref 11.5–14.5)
GLUCOSE SERPL-MCNC: 105 MG/DL (ref 74–99)
HCT VFR BLD AUTO: 41.5 % (ref 41–52)
HDLC SERPL-MCNC: 66.2 MG/DL
HGB BLD-MCNC: 14.6 G/DL (ref 13.5–17.5)
IMM GRANULOCYTES # BLD AUTO: 0.04 X10*3/UL (ref 0–0.7)
IMM GRANULOCYTES NFR BLD AUTO: 0.5 % (ref 0–0.9)
LDLC SERPL CALC-MCNC: 58 MG/DL
LYMPHOCYTES # BLD AUTO: 1.52 X10*3/UL (ref 1.2–4.8)
LYMPHOCYTES NFR BLD AUTO: 17.5 %
MCH RBC QN AUTO: 31.5 PG (ref 26–34)
MCHC RBC AUTO-ENTMCNC: 35.2 G/DL (ref 32–36)
MCV RBC AUTO: 89 FL (ref 80–100)
MONOCYTES # BLD AUTO: 0.9 X10*3/UL (ref 0.1–1)
MONOCYTES NFR BLD AUTO: 10.4 %
NEUTROPHILS # BLD AUTO: 5.97 X10*3/UL (ref 1.2–7.7)
NEUTROPHILS NFR BLD AUTO: 68.6 %
NON HDL CHOLESTEROL: 72 MG/DL (ref 0–149)
NRBC BLD-RTO: 0 /100 WBCS (ref 0–0)
PLATELET # BLD AUTO: 268 X10*3/UL (ref 150–450)
POTASSIUM SERPL-SCNC: 4 MMOL/L (ref 3.5–5.3)
PROT SERPL-MCNC: 7.4 G/DL (ref 6.4–8.2)
PSA SERPL-MCNC: 0.55 NG/ML
RBC # BLD AUTO: 4.64 X10*6/UL (ref 4.5–5.9)
SODIUM SERPL-SCNC: 135 MMOL/L (ref 136–145)
TRIGL SERPL-MCNC: 70 MG/DL (ref 0–149)
TSH SERPL-ACNC: 1.18 MIU/L (ref 0.44–3.98)
VLDL: 14 MG/DL (ref 0–40)
WBC # BLD AUTO: 8.7 X10*3/UL (ref 4.4–11.3)

## 2024-12-06 PROCEDURE — 84443 ASSAY THYROID STIM HORMONE: CPT

## 2024-12-06 PROCEDURE — 80061 LIPID PANEL: CPT

## 2024-12-06 PROCEDURE — 80053 COMPREHEN METABOLIC PANEL: CPT

## 2024-12-06 PROCEDURE — 84153 ASSAY OF PSA TOTAL: CPT

## 2024-12-06 PROCEDURE — 85025 COMPLETE CBC W/AUTO DIFF WBC: CPT

## 2024-12-06 PROCEDURE — 36415 COLL VENOUS BLD VENIPUNCTURE: CPT

## 2024-12-11 PROBLEM — M75.100 TEAR OF ROTATOR CUFF: Status: ACTIVE | Noted: 2024-12-11

## 2024-12-11 PROBLEM — Z93.3 PRESENCE OF COLOSTOMY (MULTI): Status: ACTIVE | Noted: 2024-12-11

## 2024-12-11 PROBLEM — M25.512 PAIN OF LEFT SHOULDER REGION: Status: ACTIVE | Noted: 2023-08-14

## 2024-12-12 ENCOUNTER — APPOINTMENT (OUTPATIENT)
Dept: PRIMARY CARE | Facility: CLINIC | Age: 59
End: 2024-12-12
Payer: COMMERCIAL

## 2024-12-12 VITALS
OXYGEN SATURATION: 98 % | HEIGHT: 69 IN | DIASTOLIC BLOOD PRESSURE: 78 MMHG | WEIGHT: 190 LBS | HEART RATE: 78 BPM | SYSTOLIC BLOOD PRESSURE: 118 MMHG | BODY MASS INDEX: 28.14 KG/M2 | RESPIRATION RATE: 16 BRPM

## 2024-12-12 DIAGNOSIS — E78.5 DYSLIPIDEMIA: ICD-10-CM

## 2024-12-12 DIAGNOSIS — Z12.11 SCREENING FOR COLON CANCER: ICD-10-CM

## 2024-12-12 DIAGNOSIS — Z00.00 ANNUAL PHYSICAL EXAM: ICD-10-CM

## 2024-12-12 DIAGNOSIS — I10 ESSENTIAL HYPERTENSION, BENIGN: ICD-10-CM

## 2024-12-12 DIAGNOSIS — R42 VERTIGO: ICD-10-CM

## 2024-12-12 DIAGNOSIS — E78.2 MIXED HYPERLIPIDEMIA: ICD-10-CM

## 2024-12-12 PROCEDURE — 3078F DIAST BP <80 MM HG: CPT | Performed by: FAMILY MEDICINE

## 2024-12-12 PROCEDURE — 3074F SYST BP LT 130 MM HG: CPT | Performed by: FAMILY MEDICINE

## 2024-12-12 PROCEDURE — 4004F PT TOBACCO SCREEN RCVD TLK: CPT | Performed by: FAMILY MEDICINE

## 2024-12-12 PROCEDURE — 3008F BODY MASS INDEX DOCD: CPT | Performed by: FAMILY MEDICINE

## 2024-12-12 PROCEDURE — 99396 PREV VISIT EST AGE 40-64: CPT | Performed by: FAMILY MEDICINE

## 2024-12-12 RX ORDER — ROSUVASTATIN CALCIUM 20 MG/1
20 TABLET, COATED ORAL DAILY
Qty: 30 TABLET | Refills: 11 | Status: SHIPPED | OUTPATIENT
Start: 2024-12-12

## 2024-12-12 RX ORDER — LISINOPRIL AND HYDROCHLOROTHIAZIDE 20; 25 MG/1; MG/1
1 TABLET ORAL DAILY
Qty: 30 TABLET | Refills: 11 | Status: SHIPPED | OUTPATIENT
Start: 2024-12-12

## 2024-12-12 RX ORDER — METOPROLOL SUCCINATE 100 MG/1
100 TABLET, EXTENDED RELEASE ORAL DAILY
Qty: 30 TABLET | Refills: 11 | Status: SHIPPED | OUTPATIENT
Start: 2024-12-12 | End: 2025-12-12

## 2024-12-12 RX ORDER — MECLIZINE HCL 12.5 MG 12.5 MG/1
12.5 TABLET ORAL 3 TIMES DAILY PRN
Qty: 30 TABLET | Refills: 5 | Status: SHIPPED | OUTPATIENT
Start: 2024-12-12

## 2024-12-12 RX ORDER — MELATONIN 5 MG
5-10 CAPSULE ORAL NIGHTLY PRN
COMMUNITY

## 2024-12-12 RX ORDER — AMLODIPINE BESYLATE 10 MG/1
10 TABLET ORAL DAILY
Qty: 30 TABLET | Refills: 11 | Status: SHIPPED | OUTPATIENT
Start: 2024-12-12 | End: 2025-12-12

## 2024-12-12 ASSESSMENT — ANXIETY QUESTIONNAIRES
1. FEELING NERVOUS, ANXIOUS, OR ON EDGE: NOT AT ALL
6. BECOMING EASILY ANNOYED OR IRRITABLE: NOT AT ALL
5. BEING SO RESTLESS THAT IT IS HARD TO SIT STILL: NOT AT ALL
3. WORRYING TOO MUCH ABOUT DIFFERENT THINGS: NOT AT ALL
IF YOU CHECKED OFF ANY PROBLEMS ON THIS QUESTIONNAIRE, HOW DIFFICULT HAVE THESE PROBLEMS MADE IT FOR YOU TO DO YOUR WORK, TAKE CARE OF THINGS AT HOME, OR GET ALONG WITH OTHER PEOPLE: NOT DIFFICULT AT ALL
4. TROUBLE RELAXING: NOT AT ALL
GAD7 TOTAL SCORE: 0
2. NOT BEING ABLE TO STOP OR CONTROL WORRYING: NOT AT ALL
7. FEELING AFRAID AS IF SOMETHING AWFUL MIGHT HAPPEN: NOT AT ALL

## 2024-12-12 ASSESSMENT — PATIENT HEALTH QUESTIONNAIRE - PHQ9
2. FEELING DOWN, DEPRESSED OR HOPELESS: NOT AT ALL
1. LITTLE INTEREST OR PLEASURE IN DOING THINGS: NOT AT ALL
SUM OF ALL RESPONSES TO PHQ9 QUESTIONS 1 AND 2: 0

## 2024-12-12 ASSESSMENT — ENCOUNTER SYMPTOMS
DEPRESSION: 0
OCCASIONAL FEELINGS OF UNSTEADINESS: 0
SLEEP DISTURBANCE: 1
LOSS OF SENSATION IN FEET: 0

## 2024-12-12 NOTE — PATIENT INSTRUCTIONS
Try melatonin, 5mg and increase to 10mg if needed, Call if ineffective, and will send something else.   Bloodwork looks just great  Meds refilled for one year  Call if you need anything   Latest Reference Range & Units Most Recent 12/14/22 - 12/12/24   SODIUM 136 - 145 mmol/L 135 (L)  12/6/24 07:12   POTASSIUM 3.5 - 5.3 mmol/L 4.0  12/6/24 07:12   CHLORIDE 98 - 107 mmol/L 100  12/6/24 07:12   Bicarbonate 21 - 32 mmol/L 27  12/6/24 07:12   Blood Urea Nitrogen 6 - 23 mg/dL 8  12/6/24 07:12   Creatinine 0.50 - 1.30 mg/dL 0.61  12/6/24 07:12   GLUCOSE 74 - 99 mg/dL 105 (H)  12/6/24 07:12   Calcium 8.6 - 10.3 mg/dL 9.1  12/6/24 07:12   Thyroid Stimulating Hormone 0.44 - 3.98 mIU/L 1.18  12/6/24 07:12   Alkaline Phosphatase 33 - 120 U/L 67  12/6/24 07:12   Albumin 3.4 - 5.0 g/dL 4.5  12/6/24 07:12   Total Protein 6.4 - 8.2 g/dL 7.4  12/6/24 07:12   AST 9 - 39 U/L 41 (H)  12/6/24 07:12   ALT 10 - 52 U/L 36 [1]  12/6/24 07:12   Bilirubin Total 0.0 - 1.2 mg/dL 0.7  12/6/24 07:12   PSA <=4.00 ng/mL 0.55  12/6/24 07:12   WBC 4.4 - 11.3 x10*3/uL 8.7  12/6/24 07:12   RBC 4.50 - 5.90 x10*6/uL 4.64  12/6/24 07:12   HEMOGLOBIN 13.5 - 17.5 g/dL 14.6  12/6/24 07:12   HEMATOCRIT 41.0 - 52.0 % 41.5  12/6/24 07:12   MCV 80 - 100 fL 89  12/6/24 07:12   MCH 26.0 - 34.0 pg 31.5  12/6/24 07:12   MCHC 32.0 - 36.0 g/dL 35.2  12/6/24 07:12   RED CELL DISTRIBUTION WIDTH 11.5 - 14.5 % 12.3  12/6/24 07:12   Platelets 150 - 450 x10*3/uL 268  12/6/24 07:12   Neutrophils Absolute 1.20 - 7.70 x10*3/uL 5.97 [2]  12/6/24 07:12   Neutrophils % 40.0 - 80.0 % 68.6  12/6/24 07:12   Lymphocytes Absolute 1.20 - 4.80 x10*3/uL 1.52  12/6/24 07:12   Lymphocytes % 13.0 - 44.0 % 17.5  12/6/24 07:12   Monocytes Absolute 0.10 - 1.00 x10*3/uL 0.90  12/6/24 07:12   Monocytes % 2.0 - 10.0 % 10.4  12/6/24 07:12   Eosinophils Absolute 0.00 - 0.70 x10*3/uL 0.22  12/6/24 07:12   Eosinophils % 0.0 - 6.0 % 2.5  12/6/24 07:12   Basophils Absolute 0.00 - 0.10 x10*3/uL  0.04  12/6/24 07:12   Basophils % 0.0 - 2.0 % 0.5  12/6/24 07:12   Protime 9.3 - 12.7 SEC 9.2 (L)  9/22/23 09:27   INR 0.86 - 1.16  0.9 [3]  9/22/23 09:27   aPTT 22.0 - 32.5 SEC 24.1 [4]  9/22/23 09:27   CHOLESTEROL 0 - 199 mg/dL 138 [5]  12/6/24 07:12   HDL CHOLESTEROL mg/dL 66.2 [6]  12/6/24 07:12   Cholesterol/HDL Ratio  2.1 [7]  12/6/24 07:12   LDL Calculated <=99 mg/dL 58 [8]  12/6/24 07:12   VLDL 0 - 40 mg/dL 14  12/6/24 07:12   TRIGLYCERIDES 0 - 149 mg/dL 70 [9]  12/6/24 07:12   (L): Data is abnormally low  (H): Data is abnormally high  [1] Patients treated with Sulfasalazine may generate falsely decreased results for ALT.  [2] Percent differential counts (%) should be interpreted in the context of the absolute cell counts (cells/uL).  [3] INR Therapeutic Range: 2.0-3.5  [4] Performed at [facility] [id number] [place], [place] [id number]  [5]       Age      Desirable   Borderline High   High     0-19 Y     0 - 169       170 - 199     >/= 200    20-24 Y     0 - 189       190 - 224     >/= 225         >24 Y     0 - 199       200 - 239     >/= 240   **All ranges are based on fasting samples. Specific   therapeutic targets will vary based on patient-specific   cardiac risk.    Pediatric guidelines reference:Pediatrics 2011, 128(S5).Adult guidelines reference: NCEP ATPIII Guidelines,KATHERYN 2001, 258:2486-97    Venipuncture immediately after or during the administration of Metamizole may lead to falsely low results. Testing should be performed immediately prior to Metamizole dosing.  [6]   Age       Very Low   Low     Normal    High    0-19 Y    < 35      < 40     40-45     ----  20-24 Y    ----     < 40      >45      ----        >24 Y      ----     < 40     40-60      >60    [7]   Ref Values  Desirable  < 3.4  High Risk  > 5.0  [8]                             Near   Borderline      AGE      Desirable  Optimal    High     High     Very High     0-19 Y     0 - 109     ---    110-129   >/= 130     ----    20-24 Y     0 - 119      ---    120-159   >/= 160     ----      >24 Y     0 -  99   100-129  130-159   160-189     >/=190    [9] Age              Desirable        Borderline         High        Very High  SEX:B           mg/dL             mg/dL               mg/dL      mg/dL  <=14D                       ----               ----        ----  15D-365D                    ----               ----        ----  1Y-9Y           0-74               75-99             >=100       ----  10Y-19Y        0-89                            >=130       ----  20Y-24Y        0-114             115-149             >=150      ----  >= 25Y         0-149             150-199             200-499    >=500      Venipuncture immediately after or during the administration of Metamizole may lead to falsely low results. Testing should be performed immediately prior to Metamizole dosing.

## 2024-12-12 NOTE — PROGRESS NOTES
Subjective   Kalyan Ramon is a 59 y.o. male and is here for a comprehensive physical exam. The patient reports problems - falls asleep, and then if he has to get up, he struggles to fall back asleep. Has not tried anything over the counter.  Had colonoscopy 8/2024, and had rotator cuff surgery 9/2023, Ashley.   Last physical:   Changes no  Concerns yes See above Difficulty sleeping.   Dentist no  Vision no Uses cheaters  Hearing Problems no  Diet yes Eats healthy and stopped eating after 6.   Exercise no  Alcohol no  Drugs no  Social History     Tobacco Use   Smoking Status Every Day    Current packs/day: 0.25    Average packs/day: 0.3 packs/day for 30.0 years (7.5 ttl pk-yrs)    Types: Cigarettes   Smokeless Tobacco Never          Do you take any herbs or supplements that were not prescribed by a doctor? no  Are you taking calcium supplements? no  Are you taking aspirin daily? no      History:    Date last PSA:  2024    Do you have pain that bothers you in your daily life? no  Review of Systems   Psychiatric/Behavioral:  Positive for sleep disturbance.    All other systems reviewed and are negative.       Objective   Physical Exam  Vitals reviewed.   Constitutional:       Appearance: Normal appearance.   HENT:      Head: Normocephalic and atraumatic.      Right Ear: Tympanic membrane normal.      Left Ear: Tympanic membrane normal.      Nose: Nose normal.      Mouth/Throat:      Mouth: Mucous membranes are moist.      Pharynx: Oropharynx is clear.   Eyes:      Extraocular Movements: Extraocular movements intact.      Conjunctiva/sclera: Conjunctivae normal.      Pupils: Pupils are equal, round, and reactive to light.   Cardiovascular:      Rate and Rhythm: Normal rate and regular rhythm.      Pulses: Normal pulses.      Heart sounds: Normal heart sounds.   Pulmonary:      Effort: Pulmonary effort is normal.      Breath sounds: Normal breath sounds.   Abdominal:      General: Abdomen is flat. Bowel sounds are  normal.      Palpations: Abdomen is soft.   Musculoskeletal:         General: Normal range of motion.      Cervical back: Normal range of motion and neck supple.   Skin:     General: Skin is warm and dry.      Capillary Refill: Capillary refill takes less than 2 seconds.   Neurological:      General: No focal deficit present.      Mental Status: He is alert and oriented to person, place, and time.   Psychiatric:         Mood and Affect: Mood normal.         Behavior: Behavior normal.         Assessment/Plan   Healthy male exam. 59 year old male in good health     1. Labs look great, refilled medications for hypertension and hyperlipidemia  2. Patient Counseling:  --Nutrition: Stressed importance of moderation in sodium/caffeine intake, saturated fat and cholesterol, caloric balance, sufficient intake of fresh fruits, vegetables, fiber, calcium, iron, and 1 mg of folate supplement per day (for females capable of pregnancy).  --Discussed the issue of estrogen replacement, calcium supplement, and the daily use of baby aspirin.  --Exercise: Stressed the importance of regular exercise.   --Substance Abuse: Discussed cessation/primary prevention of tobacco, alcohol, or other drug use; driving or other dangerous activities under the influence; availability of treatment for abuse.    --Sexuality: Discussed sexually transmitted diseases, partner selection, use of condoms, avoidance of unintended pregnancy  and contraceptive alternatives.   --Injury prevention: Discussed safety belts, safety helmets, smoke detector, smoking near bedding or upholstery.   --Dental health: Discussed importance of regular tooth brushing, flossing, and dental visits.  --Immunizations reviewed.  --Discussed benefits of screening colonoscopy.  --After hours service discussed with patient  3. Discussed the patient's BMI with him.  The BMI is in the acceptable range.  4. Follow up in one year

## 2025-01-08 ENCOUNTER — TELEPHONE (OUTPATIENT)
Dept: PRIMARY CARE | Facility: CLINIC | Age: 60
End: 2025-01-08
Payer: COMMERCIAL

## 2025-01-08 DIAGNOSIS — I25.10 CORONARY ARTERY CALCIFICATION: ICD-10-CM

## 2025-01-08 DIAGNOSIS — I10 PRIMARY HYPERTENSION: ICD-10-CM

## 2025-01-08 RX ORDER — EPLERENONE 25 MG/1
25 TABLET, FILM COATED ORAL DAILY
Qty: 90 TABLET | Refills: 0 | Status: SHIPPED | OUTPATIENT
Start: 2025-01-08

## 2025-01-08 NOTE — TELEPHONE ENCOUNTER
Requested Prescriptions     Pending Prescriptions Disp Refills    eplerenone (Inspra) 25 mg tablet 90 tablet 0     Sig: Take 1 tablet (25 mg) by mouth once daily.     LOV 12/12/24    NOV 12/18/25

## 2025-01-08 NOTE — TELEPHONE ENCOUNTER
Rx Refill Request Telephone Encounter    Name:  Kalyan Ramon  :  [id number]  Medication Name:  Eplerenone  25 mg        Take 1 tablet by mouth once daily  Specific Pharmacy location:  [facility]  Date of last appointment:  2024

## 2025-01-13 RX ORDER — EPLERENONE 25 MG/1
25 TABLET, FILM COATED ORAL DAILY
Qty: 90 TABLET | Refills: 0 | OUTPATIENT
Start: 2025-01-13

## 2025-03-15 NOTE — PROGRESS NOTES
[facility]    Patient Name: Kalyan Ramon  Patient : 1965    Scribe Attestation  By signing my name below, Claudette LEARY, Keiraibjessica attest that this documentation has been prepared under the direction and in the presence of Ross Raines DO.    Physician Attestation  Ross LEARY DO, personally performed the services described in the documentation as scribed by Claudette Umanzor in my presence, and confirm it is both accurate and complete.    Reason for visit:  This is a 59-year-old male here for follow-up regarding his history of coronary artery calcification, hypertension, dyslipidemia, and tobacco use.       HPI:  This is a 59-year-old male here for follow-up regarding his history of coronary artery calcification, hypertension, dyslipidemia, and tobacco use.  The patient was last evaluated by me in 2024.  At that visit he was doing reasonably well and I asked that he follow-up in 1 year and sooner if necessary.  CMP done in 2024 showed a serum sodium of 135, serum potassium of 4.0, serum creatinine 0.61, ALT of 36, AST of 41, TSH was 1.18, CBC showed hemoglobin of 14.6.  Lipid panel done in 2024 showed an LDL cholesterol 58 and triglycerides of 70 while on rosuvastatin 20 mg daily. ECG done today showed sinus rhythm with a heart rate of 82 bpm, PVCs and LVH.  The patient reports that he has been feeling generally well from the cardiac standpoint. He denies any new chest pain, shortness of breath, palpitations and lightheadedness. He states that he takes all of his medications as prescribed. During my exam, he was resting comfortably on the exam table.      Assessment/Plan:   1. Coronary artery calcification  The patient has a history of coronary artery calcification with a total score of 698.94 predominantly in the LAD territory.  ECG done today showed sinus rhythm with a heart rate of 82 bpm, PVCs and LVH.    He denies any anginal  chest discomfort.  Blood pressure appears controlled on exam today.  He should continue current antihypertensive medications.  Nuclear stress done 2/4/2022 showed a small fixed perfusion defect in the inferolateral wall which resolves on prone imaging suggesting diaphragmatic attenuation, low probability of ischemia, and a calculated ejection fraction 71%.   Recent lab works as noted in the HPI.   Lipid panel done in December 2024 showed an LDL cholesterol 58 and triglycerides of 70 while on rosuvastatin 20 mg daily.  Echocardiogram was ordered as noted below.   Please see lifestyle recommendations below.  Follow up in 1 year and sooner if necessary.      2. Hypertension  The patient has a history of hypertension which appears controlled on exam today.  He should continue his current antihypertensive medications and monitor his blood pressure at home.      3. Dyslipidemia  Lipid panel done in December 2024 showed an LDL cholesterol 58 and triglycerides of 70 while on rosuvastatin 20 mg daily.  Please see lifestyle recommendations below.     4. Tobacco Use  The patient has a history of tobacco use.  I discussed with him the importance of smoking cessation as well as several strategies to assist him in quitting smoking.  Suggested referral for tobacco cessation counseling which he declined at this time.     5. Cardiac murmur  The patient was noted to have cardiac murmur on exam today.  Echocardiogram was ordered as noted below.          Order:   Echocardiogram   Follow-up in 1 year.    Lifestyle Recommendations  I recommend a whole-food plant-based diet, an eating pattern that encourages the consumption of unrefined plant foods (such as fruits, vegetables, tubers, whole grains, legumes, nuts and seeds) and discourages meats, dairy products, eggs and processed foods.     The AHA/ACC recommends that the patient consume a dietary pattern that emphasizes intake of vegetables, fruits, and whole grains; includes low-fat  dairy products, poultry, fish, legumes, non-tropical vegetable oils, and nuts; and limits intake of sodium, sweets, sugar-sweetened beverages, and red meats.  Adapt this dietary pattern to appropriate calorie requirements (a 500-750 kcal/day deficit to loose weight), personal and cultural food preferences, and nutrition therapy for other medical conditions (including diabetes).  Achieve this pattern by following plans such as the [place] Mediterranean, DASH dietary pattern, or AHA diet.     Engage in 2 hours and 30 minutes per week of moderate-intensity physical activity, or 1 hour and 15 minutes (75 minutes) per week of vigorous-intensity aerobic physical activity, or an equivalent combination of moderate and vigorous-intensity aerobic physical activity. Aerobic activity should be performed in episodes of at least 10 minutes preferably spread throughout the week.     Adhering to a heart healthy diet, regular exercise habits, avoidance of tobacco products, and maintenance of a healthy weight are crucial components of their heart disease risk reduction.     Any positive review of systems not specifically addressed in the office visit today should be evaluated and treated by the patients primary care physician or in an emergency department if necessary     Patient was notified that results from ordered tests will be called to the patient if it changes current management; it will otherwise be discussed at a future appointment and available on [facility].     Thank you for allowing me to participate in the care of this patient.        This document was generated using the assistance of voice recognition software. If there are any errors of spelling, grammar, syntax, or meaning; please feel free to contact me directly for clarification.    Past Medical History:  He has a past medical history of Colostomy status (Multi) (01/06/2021), Hypertension, Incisional hernia without obstruction or gangrene, Incisional hernia without  obstruction or gangrene (01/06/2021), Other conditions influencing health status, and Personal history of other diseases of urinary system (08/15/2018).    Past Surgical History:  He has a past surgical history that includes Tonsillectomy (05/04/2017); Other surgical history (01/19/2021); Colostomy (04/25/2018); and Other surgical history (04/25/2018).      Social History:  He reports that he has been smoking cigarettes. He started smoking about 38 years ago. He has a 7.5 pack-year smoking history. He has never used smokeless tobacco. He reports that he does not currently use alcohol. He reports that he does not currently use drugs.    Family History:  Family History   Problem Relation Name Age of Onset    Heart disease Mother          CAD    Alzheimer's disease Mother      Hypertension Mother      Esophageal cancer Father      Breast cancer Sister      Lupus Sister          Allergies:  Spironolactone    Outpatient Medications:  Current Outpatient Medications   Medication Instructions    acetaminophen (Tylenol) 500 mg tablet 2 tablets, Every 8 hours    amLODIPine (NORVASC) 10 mg, oral, Daily    cholecalciferol (Vitamin D-3) 25 MCG (1000 UT) tablet 1 tablet, Daily    eplerenone (INSPRA) 25 mg, oral, Daily    lisinopriL-hydrochlorothiazide 20-25 mg tablet 1 tablet, oral, Daily    meclizine (ANTIVERT) 12.5 mg, oral, 3 times daily PRN    melatonin 5-10 mg, oral, Nightly PRN    metoprolol succinate XL (TOPROL-XL) 100 mg, oral, Daily    rosuvastatin (CRESTOR) 20 mg, oral, Daily        ROS:  A 14 point review of systems was done and is negative other than as stated in HPI    Vitals:      1/3/2024     1:25 PM 3/15/2024     3:18 PM 8/23/2024     8:12 AM 8/23/2024     9:46 AM 8/23/2024     9:56 AM 8/23/2024    10:06 AM 12/12/2024     8:41 AM   Vitals   Systolic  148 145 144 153 153 118   Diastolic  78 79 83 84 84 78   Heart Rate  92 81 85 79 82 78   Temp   36.6 °C (97.8 °F) 36.9 °C (98.4 °F)  36.6 °C (97.8 °F)    Resp   16 18  "18 18 16   Height 1.753 m (5' 9\") 1.753 m (5' 9\") 1.753 m (5' 9\")    1.753 m (5' 9\")   Weight (lb) 194 190 190    190   BMI 28.65 kg/m2 28.06 kg/m2 28.06 kg/m2    28.06 kg/m2   BSA (m2) 2.07 m2 2.05 m2 2.05 m2    2.05 m2        Physical Exam:   Constitutional: Cooperative, in no acute distress, alert, appears stated age.  Skin: Skin color, texture, turgor normal. No rashes or lesions.  Head: Normocephalic. No masses, lesions, tenderness or abnormalities  Eyes: Extraocular movements are grossly intact.  Mouth and throat: Mucous membranes moist  Neck: Neck supple, no carotid bruits, no JVD  Respiratory: Lungs clear to auscultation, no wheezing or rhonchi, no use of accessory muscles  Chest wall: No scars, normal excursion with respiration  Cardiovascular: Regular rhythm , + murmur  Gastrointestinal: Abdomen soft, nontender. Bowel sounds normal.  Musculoskeletal: Strength equal in upper extremities  Extremities: Trace pitting edema  Neurologic: Sensation grossly intact, alert and oriented x3        Intake/Output:   No intake/output data recorded.    Outpatient Medications  Current Outpatient Medications on File Prior to Visit   Medication Sig Dispense Refill    acetaminophen (Tylenol) 500 mg tablet Take 2 tablets (1,000 mg) by mouth every 8 hours.      amLODIPine (Norvasc) 10 mg tablet Take 1 tablet (10 mg) by mouth once daily. 30 tablet 11    cholecalciferol (Vitamin D-3) 25 MCG (1000 UT) tablet Take 1 tablet (25 mcg) by mouth once daily.      eplerenone (Inspra) 25 mg tablet Take 1 tablet (25 mg) by mouth once daily. 90 tablet 0    lisinopriL-hydrochlorothiazide 20-25 mg tablet Take 1 tablet by mouth once daily. 30 tablet 11    meclizine (Antivert) 12.5 mg tablet Take 1 tablet (12.5 mg) by mouth 3 times a day as needed for dizziness. 30 tablet 5    melatonin 5 mg capsule Take 1-2 capsules (5-10 mg) by mouth as needed at bedtime (insomnia).      metoprolol succinate XL (Toprol-XL) 100 mg 24 hr tablet Take 1 tablet " (100 mg) by mouth once daily. 30 tablet 11    rosuvastatin (Crestor) 20 mg tablet Take 1 tablet (20 mg) by mouth once daily. 30 tablet 11     No current facility-administered medications on file prior to visit.       Labs: (past 26 weeks)  Recent Results (from the past 26 weeks)   Comprehensive metabolic panel    Collection Time: 12/06/24  7:12 AM   Result Value Ref Range    Glucose 105 (H) 74 - 99 mg/dL    Sodium 135 (L) 136 - 145 mmol/L    Potassium 4.0 3.5 - 5.3 mmol/L    Chloride 100 98 - 107 mmol/L    Bicarbonate 27 21 - 32 mmol/L    Anion Gap 12 10 - 20 mmol/L    Urea Nitrogen 8 6 - 23 mg/dL    Creatinine 0.61 0.50 - 1.30 mg/dL    eGFR >90 >60 mL/min/1.73m*2    Calcium 9.1 8.6 - 10.3 mg/dL    Albumin 4.5 3.4 - 5.0 g/dL    Alkaline Phosphatase 67 33 - 120 U/L    Total Protein 7.4 6.4 - 8.2 g/dL    AST 41 (H) 9 - 39 U/L    Bilirubin, Total 0.7 0.0 - 1.2 mg/dL    ALT 36 10 - 52 U/L   Lipid Panel    Collection Time: 12/06/24  7:12 AM   Result Value Ref Range    Cholesterol 138 0 - 199 mg/dL    HDL-Cholesterol 66.2 mg/dL    Cholesterol/HDL Ratio 2.1     LDL Calculated 58 <=99 mg/dL    VLDL 14 0 - 40 mg/dL    Triglycerides 70 0 - 149 mg/dL    Non HDL Cholesterol 72 0 - 149 mg/dL   TSH with reflex to Free T4 if abnormal    Collection Time: 12/06/24  7:12 AM   Result Value Ref Range    Thyroid Stimulating Hormone 1.18 0.44 - 3.98 mIU/L   Prostate Specific Antigen    Collection Time: 12/06/24  7:12 AM   Result Value Ref Range    Prostate Specific AG 0.55 <=4.00 ng/mL   CBC and Auto Differential    Collection Time: 12/06/24  7:12 AM   Result Value Ref Range    WBC 8.7 4.4 - 11.3 x10*3/uL    nRBC 0.0 0.0 - 0.0 /100 WBCs    RBC 4.64 4.50 - 5.90 x10*6/uL    Hemoglobin 14.6 13.5 - 17.5 g/dL    Hematocrit 41.5 41.0 - 52.0 %    MCV 89 80 - 100 fL    MCH 31.5 26.0 - 34.0 pg    MCHC 35.2 32.0 - 36.0 g/dL    RDW 12.3 11.5 - 14.5 %    Platelets 268 150 - 450 x10*3/uL    Neutrophils % 68.6 40.0 - 80.0 %    Immature Granulocytes %,  Automated 0.5 0.0 - 0.9 %    Lymphocytes % 17.5 13.0 - 44.0 %    Monocytes % 10.4 2.0 - 10.0 %    Eosinophils % 2.5 0.0 - 6.0 %    Basophils % 0.5 0.0 - 2.0 %    Neutrophils Absolute 5.97 1.20 - 7.70 x10*3/uL    Immature Granulocytes Absolute, Automated 0.04 0.00 - 0.70 x10*3/uL    Lymphocytes Absolute 1.52 1.20 - 4.80 x10*3/uL    Monocytes Absolute 0.90 0.10 - 1.00 x10*3/uL    Eosinophils Absolute 0.22 0.00 - 0.70 x10*3/uL    Basophils Absolute 0.04 0.00 - 0.10 x10*3/uL       ECG  No results found for this or any previous visit (from the past 4464 hours).    Echocardiogram  No results found for this or any previous visit from the past 1095 days.      CV Studies:  EKG:No results found for this or any previous visit (from the past 4464 hours).  Echocardiogram: No results found for this or any previous visit from the past 1825 days.    [id number]   NM CARDIAC STRESS REST (MYOCARDIAL PERFUSION MIBI) 02/04/2022    Narrative  MRN: [id number]  Patient Name: MATTHEW JANSEN    STUDY:  CARDIAC STRESS/REST INJECTION; PART 2 STRESS OR REST (NO CHARGE);  CARDIAC STRESS/REST (MYOCARDIAL PERFUSION/MIBI);  2/4/2022 11:19 am    INDICATION:  Coronary artery calcification  I10: Essential hypertension, benign  I25.10: Coronary artery calcification E78.5: Dyslipidemia.    COMPARISON:  None.    ACCESSION NUMBER(S):  [id number]; [id number]; [id number]    ORDERING CLINICIAN:  BETTY RODRIGEZ    TECHNIQUE:  DIVISION OF NUCLEAR MEDICINE  STRESS MYOCARDIAL PERFUSION SCAN, ONE DAY PROTOCOL    The patient received an intravenous dose of  10.4 mCi of Tc-99m  tetrofosmin and resting emission tomographic (SPECT) images of the  myocardium were acquired. The patient then exercised via treadmill  stress to  103 % of MPHR and achieved  7.0 METS. At peak stress  35.8  mCi of Tc-99m tetrofosmin were administered and stress phase SPECT  images of the myocardium were then acquired. These included ECG-gated  images to assess and quantify  ventricular function.    FINDINGS:    There is a small fixed perfusion defect in the inferolateral wall  which resolves on prone imaging suggesting diaphragmatic attenuation.  No reversible perfusion defects seen.    Calculated ejection fraction of 71% without segmental wall motion  abnormalities seen.    Impression  1. There is a small fixed perfusion defect in the inferolateral wall  which resolves on prone imaging suggesting diaphragmatic attenuation.  There is a low probability of ischemia.    2. Calculated ejection fraction of 71% without segmental wall motion  abnormalities seen.    Cardiac Catheterization: No results found for this or any previous visit from the past 1825 days.  No results found for this or any previous visit from the past 3650 days.     Cardiac Scoring:   CT HEART CALCIUM SCORING WO IV CONTRAST 01/04/2022    Narrative  MRN: [id number]  Patient Name: MATTHEW JANSEN    STUDY:  CT CARDIAC SCORING;  1/4/2022 7:46 am    INDICATION:  cardiac screening  Z13.6: Screening for cardiovascular condition.    COMPARISON:  None.    ACCESSION NUMBER(S):  [id number]    ORDERING CLINICIAN:  EDUARDO LOPEZ    TECHNIQUE:  Using prospective ECG gating, CT scan of the coronary arteries was  performed without intravenous contrast. Coronary calcium scoring  was  performed according to the method of Agatston.    FINDINGS:  The score and distribution of calcium in the coronary arteries is as  follows:    LM: 0.  LAD: 655.11.  LCx: 36.24.  RCA: 7.59.    Total: 698.94.    The visualized segments of the lungs demonstrate mild areas of  atelectasis and/or scarring, predominantly in the lower lobes.    The visualized mid/lower ascending thoracic aorta measures 3.7 cm in  diameter.  This is mildly ectatic.    The heart is normal in size. No pericardial effusion is present.    No gross evidence of mediastinal or hilar lymphadenopathy is  identified.    The visualized subdiaphragmatic structures appear grossly  "intact.    Impression  1. Coronary artery calcium score of 698.94*.    *Coronary artery calcium scoring may be helpful in predicting the  risk for future coronary heart disease events.  According to the  American College of Cardiology Foundation Clinical Expert Consensus  Task Force, such testing provides important prognostic information in  patients with more than one coronary heart disease risk factor. The  coronary artery calcium score correlates with the annual risk of a  non-fatal myocardial infarction or coronary heart disease death.    Coronary artery score            Annual Risk    0-99                             0.4%  100-399                        1.3%  >400                            2.4%    These three \"breakpoints\" correspond to lower, intermediate and high  risk states for future coronary events.  Such information should be  used, along with appropriate clinical judgment, to make decisions  regarding the intensity of risk factor management strategies to treat  blood lipids and to modify other non-lipid coronary risk factors.    Reference: Ceres P et al. Circulation.  2007; 115:402-426    AAA : No results found for this or any previous visit from the past 1825 days.    OTHER: No results found for this or any previous visit from the past 1825 days.    LAST IMAGING RESULTS  Colonoscopy Screening; Average Risk Patient  Table formatting from the original result was not included.  Impression  The ileocecal valve appeared normal.  The cecum appeared normal.  One polyp measuring 10-19 mm in the rectum; performed cold snare removal  The rectosigmoid appeared normal.    Findings  The ileocecal valve appeared normal.  The cecum appeared normal.  One pedunculated and adenomatous-appearing polyp measuring 10-19 mm in the   rectum; performed cold snare with complete en bloc removal and retrieved   specimen  The rectosigmoid appeared normal.       Recommendation  Await pathology results   Follow up with me in " clinic, due: 9/17/2024     Indication  Screen for colon cancer    Staff  Staff Role   Sammi Cid MD Proceduralist     Medications  See Anesthesia Record.     Preprocedure  A history and physical has been performed, and patient medication   allergies have been reviewed. The patient's tolerance of previous   anesthesia has been reviewed. The risks and benefits of the procedure and   the sedation options and risks were discussed with the patient. All   questions were answered and informed consent obtained.    Details of the Procedure  The patient underwent monitored anesthesia care, which was administered by   an anesthesia professional. The patient's blood pressure, ECG, ETCO2,   heart rate, level of consciousness, oxygen and respirations were monitored   throughout the procedure. A digital rectal exam was performed. The scope   was introduced through the anus and advanced to the cecum. The quality of   bowel preparation was evaluated using the [place] Bowel Preparation Scale   with scores of: right colon = 2, transverse colon = 2, left colon = 2. The   total BBPS score was 6. Bowel prep was adequate. The patient's estimated   blood loss was minimal (<5 mL). The procedure was not difficult. The   patient tolerated the procedure well. There were no apparent adverse   events.     Events  Procedure Events   Event Event Time   ENDO SCOPE IN TIME 8/23/2024  9:30 AM   ENDO CECUM REACHED 8/23/2024  9:33 AM   ENDO SCOPE OUT TIME 8/23/2024  9:42 AM     Specimens  ID Type Source Tests Collected by Time   1 :  Tissue RECTUM POLYPECTOMY SURGICAL PATHOLOGY EXAM Sammi Cid MD   8/23/2024 0942     Procedure Location  [address] [address] [phone]    Referring Provider  Sammi Cid MD    Procedure Provider  Sammi Cid MD      Problem List Items Addressed This Visit       Coronary artery calcification - Primary    Dyslipidemia     Primary hypertension    Tobacco use          Ross Raines DO, FACC, FACOI

## 2025-03-19 ENCOUNTER — APPOINTMENT (OUTPATIENT)
Dept: CARDIOLOGY | Facility: CLINIC | Age: 60
End: 2025-03-19
Payer: COMMERCIAL

## 2025-03-19 VITALS
HEART RATE: 82 BPM | SYSTOLIC BLOOD PRESSURE: 128 MMHG | HEIGHT: 69 IN | BODY MASS INDEX: 28.29 KG/M2 | WEIGHT: 191 LBS | DIASTOLIC BLOOD PRESSURE: 72 MMHG

## 2025-03-19 DIAGNOSIS — I25.10 CORONARY ARTERY CALCIFICATION: Primary | ICD-10-CM

## 2025-03-19 DIAGNOSIS — I10 PRIMARY HYPERTENSION: ICD-10-CM

## 2025-03-19 DIAGNOSIS — E78.5 DYSLIPIDEMIA: ICD-10-CM

## 2025-03-19 DIAGNOSIS — Z72.0 TOBACCO USE: ICD-10-CM

## 2025-03-19 DIAGNOSIS — R01.1 MURMUR, HEART: ICD-10-CM

## 2025-03-19 LAB
ATRIAL RATE: 82 BPM
P AXIS: 22 DEGREES
P OFFSET: 197 MS
P ONSET: 136 MS
PR INTERVAL: 168 MS
Q ONSET: 220 MS
QRS COUNT: 14 BEATS
QRS DURATION: 90 MS
QT INTERVAL: 362 MS
QTC CALCULATION(BAZETT): 422 MS
QTC FREDERICIA: 401 MS
R AXIS: -5 DEGREES
T AXIS: 5 DEGREES
T OFFSET: 401 MS
VENTRICULAR RATE: 82 BPM

## 2025-03-19 PROCEDURE — 3074F SYST BP LT 130 MM HG: CPT | Performed by: INTERNAL MEDICINE

## 2025-03-19 PROCEDURE — 3078F DIAST BP <80 MM HG: CPT | Performed by: INTERNAL MEDICINE

## 2025-03-19 PROCEDURE — 93005 ELECTROCARDIOGRAM TRACING: CPT | Performed by: INTERNAL MEDICINE

## 2025-03-19 PROCEDURE — 99214 OFFICE O/P EST MOD 30 MIN: CPT | Mod: 25 | Performed by: INTERNAL MEDICINE

## 2025-03-19 PROCEDURE — 3008F BODY MASS INDEX DOCD: CPT | Performed by: INTERNAL MEDICINE

## 2025-03-19 PROCEDURE — 99214 OFFICE O/P EST MOD 30 MIN: CPT | Performed by: INTERNAL MEDICINE

## 2025-03-19 PROCEDURE — 93010 ELECTROCARDIOGRAM REPORT: CPT | Performed by: INTERNAL MEDICINE

## 2025-03-19 RX ORDER — EPLERENONE 25 MG/1
25 TABLET ORAL DAILY
Qty: 90 TABLET | Refills: 3 | Status: SHIPPED | OUTPATIENT
Start: 2025-03-19

## 2025-03-19 ASSESSMENT — ENCOUNTER SYMPTOMS
OCCASIONAL FEELINGS OF UNSTEADINESS: 0
LOSS OF SENSATION IN FEET: 0
DEPRESSION: 0

## 2025-04-09 ENCOUNTER — HOSPITAL ENCOUNTER (OUTPATIENT)
Dept: CARDIOLOGY | Facility: HOSPITAL | Age: 60
Discharge: HOME | End: 2025-04-09
Payer: COMMERCIAL

## 2025-04-09 DIAGNOSIS — E78.5 DYSLIPIDEMIA: ICD-10-CM

## 2025-04-09 DIAGNOSIS — I10 PRIMARY HYPERTENSION: ICD-10-CM

## 2025-04-09 DIAGNOSIS — R01.1 MURMUR, HEART: ICD-10-CM

## 2025-04-09 DIAGNOSIS — Z72.0 TOBACCO USE: ICD-10-CM

## 2025-04-09 DIAGNOSIS — I25.10 CORONARY ARTERY CALCIFICATION: ICD-10-CM

## 2025-04-09 PROCEDURE — 93306 TTE W/DOPPLER COMPLETE: CPT | Performed by: INTERNAL MEDICINE

## 2025-04-09 PROCEDURE — 93306 TTE W/DOPPLER COMPLETE: CPT

## 2025-04-10 ENCOUNTER — TELEPHONE (OUTPATIENT)
Dept: CARDIOLOGY | Facility: HOSPITAL | Age: 60
End: 2025-04-10
Payer: COMMERCIAL

## 2025-04-10 LAB
AORTIC VALVE MEAN GRADIENT: 11 MMHG
AORTIC VALVE PEAK VELOCITY: 2.31 M/S
AV PEAK GRADIENT: 21 MMHG
AVA (PEAK VEL): 2 CM2
AVA (VTI): 2.41 CM2
EJECTION FRACTION APICAL 4 CHAMBER: 61.1
EJECTION FRACTION: 62 %
GLOBAL LONGITUDINAL STRAIN: 18.8 %
LEFT ATRIUM VOLUME AREA LENGTH INDEX BSA: 15.4 ML/M2
LEFT VENTRICLE INTERNAL DIMENSION DIASTOLE: 5.75 CM (ref 3.5–6)
LEFT VENTRICULAR OUTFLOW TRACT DIAMETER: 2 CM
LV EJECTION FRACTION BIPLANE: 62 %
MITRAL VALVE E/A RATIO: 0.87
MITRAL VALVE E/E' RATIO: 9.48
RIGHT VENTRICLE FREE WALL PEAK S': 16.58 CM/S
RIGHT VENTRICLE PEAK SYSTOLIC PRESSURE: 11.1 MMHG
TRICUSPID ANNULAR PLANE SYSTOLIC EXCURSION: 2.5 CM

## 2025-04-10 NOTE — TELEPHONE ENCOUNTER
4/10/25  1744  Called echocardiogram results and continuation of current care directive to patient with patient verbalizing understanding.      ----- Message from Ross Raines sent at 4/10/2025 12:41 PM EDT -----  Echocardiogram shows normal left ventricular systolic function with an ejection fraction of 62%, bicuspid aortic valve with partial fusion between the left and right coronary cusps, aortic valve sclerosis without significant stenosis or regurgitation.  Continue current care.

## 2025-12-18 ENCOUNTER — APPOINTMENT (OUTPATIENT)
Dept: PRIMARY CARE | Facility: CLINIC | Age: 60
End: 2025-12-18
Payer: COMMERCIAL